# Patient Record
Sex: MALE | Race: BLACK OR AFRICAN AMERICAN | NOT HISPANIC OR LATINO | URBAN - METROPOLITAN AREA
[De-identification: names, ages, dates, MRNs, and addresses within clinical notes are randomized per-mention and may not be internally consistent; named-entity substitution may affect disease eponyms.]

---

## 2017-01-04 ENCOUNTER — EMERGENCY (EMERGENCY)
Facility: HOSPITAL | Age: 47
LOS: 1 days | Discharge: HOME | End: 2017-01-04
Attending: STUDENT IN AN ORGANIZED HEALTH CARE EDUCATION/TRAINING PROGRAM | Admitting: EMERGENCY MEDICINE
Payer: MEDICAID

## 2017-01-04 VITALS
WEIGHT: 315 LBS | SYSTOLIC BLOOD PRESSURE: 202 MMHG | RESPIRATION RATE: 16 BRPM | TEMPERATURE: 98 F | HEART RATE: 79 BPM | DIASTOLIC BLOOD PRESSURE: 118 MMHG | OXYGEN SATURATION: 96 %

## 2017-01-04 VITALS
TEMPERATURE: 98 F | SYSTOLIC BLOOD PRESSURE: 146 MMHG | RESPIRATION RATE: 13 BRPM | OXYGEN SATURATION: 100 % | DIASTOLIC BLOOD PRESSURE: 89 MMHG | HEART RATE: 76 BPM

## 2017-01-04 DIAGNOSIS — G47.33 OBSTRUCTIVE SLEEP APNEA (ADULT) (PEDIATRIC): ICD-10-CM

## 2017-01-04 DIAGNOSIS — E11.9 TYPE 2 DIABETES MELLITUS WITHOUT COMPLICATIONS: ICD-10-CM

## 2017-01-04 DIAGNOSIS — I10 ESSENTIAL (PRIMARY) HYPERTENSION: ICD-10-CM

## 2017-01-04 DIAGNOSIS — F17.200 NICOTINE DEPENDENCE, UNSPECIFIED, UNCOMPLICATED: ICD-10-CM

## 2017-01-04 DIAGNOSIS — R00.2 PALPITATIONS: ICD-10-CM

## 2017-01-04 DIAGNOSIS — Z79.84 LONG TERM (CURRENT) USE OF ORAL HYPOGLYCEMIC DRUGS: ICD-10-CM

## 2017-01-04 PROCEDURE — 99284 EMERGENCY DEPT VISIT MOD MDM: CPT | Mod: 25

## 2017-01-04 PROCEDURE — 99284 EMERGENCY DEPT VISIT MOD MDM: CPT

## 2017-01-04 PROCEDURE — 93005 ELECTROCARDIOGRAM TRACING: CPT

## 2017-01-04 PROCEDURE — 93010 ELECTROCARDIOGRAM REPORT: CPT

## 2017-01-04 RX ORDER — SIMETHICONE 80 MG/1
80 TABLET, CHEWABLE ORAL ONCE
Qty: 0 | Refills: 0 | Status: COMPLETED | OUTPATIENT
Start: 2017-01-04 | End: 2017-01-04

## 2017-01-04 RX ORDER — METOPROLOL TARTRATE 50 MG
50 TABLET ORAL ONCE
Qty: 0 | Refills: 0 | Status: COMPLETED | OUTPATIENT
Start: 2017-01-04 | End: 2017-01-04

## 2017-01-04 RX ORDER — METOPROLOL TARTRATE 50 MG
1 TABLET ORAL
Qty: 60 | Refills: 0 | OUTPATIENT
Start: 2017-01-04 | End: 2017-02-03

## 2017-01-04 RX ADMIN — Medication 50 MILLIGRAM(S): at 04:33

## 2017-01-04 RX ADMIN — SIMETHICONE 80 MILLIGRAM(S): 80 TABLET, CHEWABLE ORAL at 04:33

## 2017-01-04 NOTE — ED PROVIDER NOTE - CARE PLAN
Principal Discharge DX:	HTN (hypertension)  Secondary Diagnosis:	Obstructive sleep apnea syndrome, moderate

## 2017-01-04 NOTE — ED PROVIDER NOTE - OBJECTIVE STATEMENT
46 year old male with a history of DM, HTN presents with palpitations/anxiety. Patient states he has OSAS for which he does not use cpap. He has been waking up at night feeling like he cannot breathe and then becomes anxious. Patient denies chest pain. States he woke up feeling like he was having a panic attack. Has been taking Hypertrol for BP (vitamin) because he had an allergic reaction to lisinopril. Feels well at this time. PMD Dr. Kulkarni

## 2017-01-04 NOTE — ED PROVIDER NOTE - CONSTITUTIONAL, MLM
normal... Well appearing, well nourished, awake, alert, oriented to person, place, time/situation and in no apparent distress. Patient obese

## 2017-01-04 NOTE — ED PROVIDER NOTE - PROGRESS NOTE DETAILS
Patient states he feels well at this time. Declining further w/u including blood work and CXR. States he will f/u with ENT for OSAS and take HTN meds. Denies any complaints at this time

## 2019-03-14 ENCOUNTER — EMERGENCY (EMERGENCY)
Facility: HOSPITAL | Age: 49
LOS: 1 days | Discharge: HOME | End: 2019-03-14
Attending: INTERNAL MEDICINE | Admitting: INTERNAL MEDICINE
Payer: SELF-PAY

## 2019-03-14 VITALS
HEART RATE: 96 BPM | WEIGHT: 238.98 LBS | TEMPERATURE: 98 F | OXYGEN SATURATION: 99 % | RESPIRATION RATE: 16 BRPM | DIASTOLIC BLOOD PRESSURE: 75 MMHG | SYSTOLIC BLOOD PRESSURE: 125 MMHG

## 2019-03-14 DIAGNOSIS — Z98.890 OTHER SPECIFIED POSTPROCEDURAL STATES: Chronic | ICD-10-CM

## 2019-03-14 PROCEDURE — 73030 X-RAY EXAM OF SHOULDER: CPT

## 2019-03-14 PROCEDURE — 73030 X-RAY EXAM OF SHOULDER: CPT | Mod: 26,LT

## 2019-03-14 PROCEDURE — 99284 EMERGENCY DEPT VISIT MOD MDM: CPT

## 2019-03-14 PROCEDURE — 99283 EMERGENCY DEPT VISIT LOW MDM: CPT | Mod: 25

## 2019-03-14 NOTE — ED ADULT NURSE NOTE - NSIMPLEMENTINTERV_GEN_ALL_ED
Implemented All Universal Safety Interventions:  [place] to call system. Call bell, personal items and telephone within reach. Instruct patient to call for assistance. Room bathroom lighting operational. Non-slip footwear when patient is off stretcher. Physically safe environment: no spills, clutter or unnecessary equipment. Stretcher in lowest position, wheels locked, appropriate side rails in place.

## 2019-03-14 NOTE — ED ADULT NURSE NOTE - OBJECTIVE STATEMENT
pt reports unable to abduct left shoulder  He injured arm at work yesterday and has had pain since  skin intact no obvious deformity present

## 2019-03-15 NOTE — ED PROVIDER NOTE - OBJECTIVE STATEMENT
47 y/o black man who fell onto his closed left shoulder a work , he has shoulder pain, and pain on passive range of motion, no weakness, no sensory changes

## 2019-06-08 ENCOUNTER — EMERGENCY (EMERGENCY)
Facility: HOSPITAL | Age: 49
LOS: 1 days | End: 2019-06-08
Attending: INTERNAL MEDICINE
Payer: MEDICAID

## 2019-06-08 VITALS
TEMPERATURE: 99 F | DIASTOLIC BLOOD PRESSURE: 104 MMHG | OXYGEN SATURATION: 98 % | SYSTOLIC BLOOD PRESSURE: 165 MMHG | RESPIRATION RATE: 18 BRPM | WEIGHT: 214.95 LBS | HEART RATE: 78 BPM

## 2019-06-08 DIAGNOSIS — Z98.890 OTHER SPECIFIED POSTPROCEDURAL STATES: Chronic | ICD-10-CM

## 2019-06-08 LAB
ALBUMIN SERPL ELPH-MCNC: 3.8 G/DL — SIGNIFICANT CHANGE UP (ref 3.3–5)
ALP SERPL-CCNC: 63 U/L — SIGNIFICANT CHANGE UP (ref 40–120)
ALT FLD-CCNC: 17 U/L — SIGNIFICANT CHANGE UP (ref 12–78)
ANION GAP SERPL CALC-SCNC: 6 MMOL/L — SIGNIFICANT CHANGE UP (ref 5–17)
AST SERPL-CCNC: 30 U/L — SIGNIFICANT CHANGE UP (ref 15–37)
BILIRUB SERPL-MCNC: 0.3 MG/DL — SIGNIFICANT CHANGE UP (ref 0.2–1.2)
BUN SERPL-MCNC: 18 MG/DL — SIGNIFICANT CHANGE UP (ref 7–23)
CALCIUM SERPL-MCNC: 8.9 MG/DL — SIGNIFICANT CHANGE UP (ref 8.5–10.1)
CHLORIDE SERPL-SCNC: 107 MMOL/L — SIGNIFICANT CHANGE UP (ref 96–108)
CO2 SERPL-SCNC: 28 MMOL/L — SIGNIFICANT CHANGE UP (ref 22–31)
CREAT SERPL-MCNC: 1.2 MG/DL — SIGNIFICANT CHANGE UP (ref 0.5–1.3)
GLUCOSE SERPL-MCNC: 96 MG/DL — SIGNIFICANT CHANGE UP (ref 70–99)
HCT VFR BLD CALC: 38.1 % — LOW (ref 39–50)
HGB BLD-MCNC: 13.5 G/DL — SIGNIFICANT CHANGE UP (ref 13–17)
MCHC RBC-ENTMCNC: 28.9 PG — SIGNIFICANT CHANGE UP (ref 27–34)
MCHC RBC-ENTMCNC: 35.4 GM/DL — SIGNIFICANT CHANGE UP (ref 32–36)
MCV RBC AUTO: 81.6 FL — SIGNIFICANT CHANGE UP (ref 80–100)
NRBC # BLD: 0 /100 WBCS — SIGNIFICANT CHANGE UP (ref 0–0)
PLATELET # BLD AUTO: 198 K/UL — SIGNIFICANT CHANGE UP (ref 150–400)
POTASSIUM SERPL-MCNC: 4 MMOL/L — SIGNIFICANT CHANGE UP (ref 3.5–5.3)
POTASSIUM SERPL-SCNC: 4 MMOL/L — SIGNIFICANT CHANGE UP (ref 3.5–5.3)
PROT SERPL-MCNC: 7.2 G/DL — SIGNIFICANT CHANGE UP (ref 6–8.3)
RBC # BLD: 4.67 M/UL — SIGNIFICANT CHANGE UP (ref 4.2–5.8)
RBC # FLD: 13.8 % — SIGNIFICANT CHANGE UP (ref 10.3–14.5)
SODIUM SERPL-SCNC: 141 MMOL/L — SIGNIFICANT CHANGE UP (ref 135–145)
WBC # BLD: 7.27 K/UL — SIGNIFICANT CHANGE UP (ref 3.8–10.5)
WBC # FLD AUTO: 7.27 K/UL — SIGNIFICANT CHANGE UP (ref 3.8–10.5)

## 2019-06-08 PROCEDURE — 96375 TX/PRO/DX INJ NEW DRUG ADDON: CPT

## 2019-06-08 PROCEDURE — 99284 EMERGENCY DEPT VISIT MOD MDM: CPT

## 2019-06-08 PROCEDURE — 99284 EMERGENCY DEPT VISIT MOD MDM: CPT | Mod: 25

## 2019-06-08 PROCEDURE — 96374 THER/PROPH/DIAG INJ IV PUSH: CPT

## 2019-06-08 PROCEDURE — 36415 COLL VENOUS BLD VENIPUNCTURE: CPT

## 2019-06-08 PROCEDURE — 85027 COMPLETE CBC AUTOMATED: CPT

## 2019-06-08 PROCEDURE — 80053 COMPREHEN METABOLIC PANEL: CPT

## 2019-06-08 RX ORDER — DIPHENHYDRAMINE HCL 50 MG
50 CAPSULE ORAL ONCE
Refills: 0 | Status: COMPLETED | OUTPATIENT
Start: 2019-06-08 | End: 2019-06-08

## 2019-06-08 RX ORDER — HYDROCORTISONE 1 %
1 OINTMENT (GRAM) TOPICAL
Qty: 30 | Refills: 0
Start: 2019-06-08 | End: 2019-06-21

## 2019-06-08 RX ADMIN — Medication 50 MILLIGRAM(S): at 21:38

## 2019-06-08 RX ADMIN — Medication 125 MILLIGRAM(S): at 21:38

## 2019-06-08 NOTE — ED ADULT NURSE NOTE - CCCP TRG CHIEF CMPLNT
Received call from Km Gomez with [facility]. Km Gomez stated she is currently with pt and pt is c/o productive cough and not feeling any better from recent pna. Pt does have mucinex at home, but are unsure if it is safe for pt to take. Notified Km Gomez I would send message to Dr. Roberto Wharton. Km Gomez verbalized understanding of information discussed w/ no further questions at this time. rash

## 2019-06-08 NOTE — ED ADULT NURSE NOTE - BREATH SOUNDS, MLM
[Normal] : no peripheral adenopathy appreciated [Ulcers] : no ulcers [Mucositis] : no mucositis [Thrush] : no thrush [de-identified] : no residual adenopathy .  [de-identified] : no organomegaly, mild RUQ tenderness, no masses , no rebound.  Clear

## 2019-06-08 NOTE — ED PROVIDER NOTE - OBJECTIVE STATEMENT
pt with rash to right side of chest/abd denies pain just itching  rash 50 y/o BF with dermitis  on the right side of his chest and right upper arm. Some itching, no fever, no toxemia, no h/o poison ivy

## 2019-07-01 ENCOUNTER — OUTPATIENT (OUTPATIENT)
Dept: OUTPATIENT SERVICES | Facility: HOSPITAL | Age: 49
LOS: 1 days | End: 2019-07-01
Payer: MEDICAID

## 2019-07-01 DIAGNOSIS — Z98.890 OTHER SPECIFIED POSTPROCEDURAL STATES: Chronic | ICD-10-CM

## 2019-07-01 PROCEDURE — G9001: CPT

## 2019-07-05 DIAGNOSIS — Z71.89 OTHER SPECIFIED COUNSELING: ICD-10-CM

## 2020-01-29 ENCOUNTER — OUTPATIENT (OUTPATIENT)
Dept: OUTPATIENT SERVICES | Facility: HOSPITAL | Age: 50
LOS: 1 days | End: 2020-01-29
Payer: COMMERCIAL

## 2020-01-29 VITALS
SYSTOLIC BLOOD PRESSURE: 160 MMHG | TEMPERATURE: 99 F | WEIGHT: 227.08 LBS | HEART RATE: 58 BPM | OXYGEN SATURATION: 100 % | RESPIRATION RATE: 16 BRPM | DIASTOLIC BLOOD PRESSURE: 84 MMHG

## 2020-01-29 DIAGNOSIS — E11.9 TYPE 2 DIABETES MELLITUS WITHOUT COMPLICATIONS: ICD-10-CM

## 2020-01-29 DIAGNOSIS — Z98.890 OTHER SPECIFIED POSTPROCEDURAL STATES: Chronic | ICD-10-CM

## 2020-01-29 DIAGNOSIS — M75.42 IMPINGEMENT SYNDROME OF LEFT SHOULDER: ICD-10-CM

## 2020-01-29 DIAGNOSIS — Z01.818 ENCOUNTER FOR OTHER PREPROCEDURAL EXAMINATION: ICD-10-CM

## 2020-01-29 DIAGNOSIS — S41.012D LACERATION WITHOUT FOREIGN BODY OF LEFT SHOULDER, SUBSEQUENT ENCOUNTER: ICD-10-CM

## 2020-01-29 LAB
ALBUMIN SERPL ELPH-MCNC: 3.8 G/DL — SIGNIFICANT CHANGE UP (ref 3.3–5)
ALP SERPL-CCNC: 92 U/L — SIGNIFICANT CHANGE UP (ref 40–120)
ALT FLD-CCNC: 16 U/L — SIGNIFICANT CHANGE UP (ref 12–78)
ANION GAP SERPL CALC-SCNC: 3 MMOL/L — LOW (ref 5–17)
AST SERPL-CCNC: 22 U/L — SIGNIFICANT CHANGE UP (ref 15–37)
BILIRUB SERPL-MCNC: 0.4 MG/DL — SIGNIFICANT CHANGE UP (ref 0.2–1.2)
BUN SERPL-MCNC: 20 MG/DL — SIGNIFICANT CHANGE UP (ref 7–23)
CALCIUM SERPL-MCNC: 9.3 MG/DL — SIGNIFICANT CHANGE UP (ref 8.5–10.1)
CHLORIDE SERPL-SCNC: 109 MMOL/L — HIGH (ref 96–108)
CO2 SERPL-SCNC: 28 MMOL/L — SIGNIFICANT CHANGE UP (ref 22–31)
CREAT SERPL-MCNC: 1 MG/DL — SIGNIFICANT CHANGE UP (ref 0.5–1.3)
GLUCOSE SERPL-MCNC: 95 MG/DL — SIGNIFICANT CHANGE UP (ref 70–99)
HBA1C BLD-MCNC: 5.2 % — SIGNIFICANT CHANGE UP (ref 4–5.6)
HCT VFR BLD CALC: 43.3 % — SIGNIFICANT CHANGE UP (ref 39–50)
HGB BLD-MCNC: 15.3 G/DL — SIGNIFICANT CHANGE UP (ref 13–17)
MCHC RBC-ENTMCNC: 29.5 PG — SIGNIFICANT CHANGE UP (ref 27–34)
MCHC RBC-ENTMCNC: 35.3 GM/DL — SIGNIFICANT CHANGE UP (ref 32–36)
MCV RBC AUTO: 83.4 FL — SIGNIFICANT CHANGE UP (ref 80–100)
NRBC # BLD: 0 /100 WBCS — SIGNIFICANT CHANGE UP (ref 0–0)
PLATELET # BLD AUTO: 147 K/UL — LOW (ref 150–400)
POTASSIUM SERPL-MCNC: 4.3 MMOL/L — SIGNIFICANT CHANGE UP (ref 3.5–5.3)
POTASSIUM SERPL-SCNC: 4.3 MMOL/L — SIGNIFICANT CHANGE UP (ref 3.5–5.3)
PROT SERPL-MCNC: 7.5 G/DL — SIGNIFICANT CHANGE UP (ref 6–8.3)
RBC # BLD: 5.19 M/UL — SIGNIFICANT CHANGE UP (ref 4.2–5.8)
RBC # FLD: 13.3 % — SIGNIFICANT CHANGE UP (ref 10.3–14.5)
SODIUM SERPL-SCNC: 140 MMOL/L — SIGNIFICANT CHANGE UP (ref 135–145)
WBC # BLD: 5.92 K/UL — SIGNIFICANT CHANGE UP (ref 3.8–10.5)
WBC # FLD AUTO: 5.92 K/UL — SIGNIFICANT CHANGE UP (ref 3.8–10.5)

## 2020-01-29 PROCEDURE — 85027 COMPLETE CBC AUTOMATED: CPT

## 2020-01-29 PROCEDURE — G0463: CPT

## 2020-01-29 PROCEDURE — 93005 ELECTROCARDIOGRAM TRACING: CPT

## 2020-01-29 PROCEDURE — 93010 ELECTROCARDIOGRAM REPORT: CPT

## 2020-01-29 PROCEDURE — 83036 HEMOGLOBIN GLYCOSYLATED A1C: CPT

## 2020-01-29 PROCEDURE — 80053 COMPREHEN METABOLIC PANEL: CPT

## 2020-01-29 PROCEDURE — 36415 COLL VENOUS BLD VENIPUNCTURE: CPT

## 2020-01-29 RX ORDER — GLUCAGON INJECTION, SOLUTION 0.5 MG/.1ML
1 INJECTION, SOLUTION SUBCUTANEOUS ONCE
Refills: 0 | Status: DISCONTINUED | OUTPATIENT
Start: 2020-02-07 | End: 2020-02-07

## 2020-01-29 RX ORDER — LOSARTAN POTASSIUM 100 MG/1
1 TABLET, FILM COATED ORAL
Qty: 0 | Refills: 0 | DISCHARGE

## 2020-01-29 RX ORDER — SIMVASTATIN 20 MG/1
0 TABLET, FILM COATED ORAL
Qty: 0 | Refills: 0 | DISCHARGE

## 2020-01-29 RX ORDER — SODIUM CHLORIDE 9 MG/ML
1000 INJECTION, SOLUTION INTRAVENOUS
Refills: 0 | Status: DISCONTINUED | OUTPATIENT
Start: 2020-02-07 | End: 2020-02-07

## 2020-01-29 RX ORDER — METFORMIN HYDROCHLORIDE 850 MG/1
0 TABLET ORAL
Qty: 0 | Refills: 0 | DISCHARGE

## 2020-01-29 NOTE — H&P PST ADULT - NSICDXPASTSURGICALHX_GEN_ALL_CORE_FT
PAST SURGICAL HISTORY:  H/O elbow surgery (Left elbow, 1982)    H/O knee surgery (Left, arthoscopic 1989)    S/P colonoscopy (11/2019) PAST SURGICAL HISTORY:  H/O elbow surgery (Left elbow, 1982)    H/O knee surgery (Left, arthroscopic 1989)    S/P colonoscopy (11/2019)

## 2020-01-29 NOTE — H&P PST ADULT - MUSCULOSKELETAL COMMENTS
See HPI Left shoulder - decreased ROM, decreased strength, (+) tenderness to palpation, no edema, no erythema

## 2020-01-29 NOTE — H&P PST ADULT - GENERAL COMMENTS
Pt denies history of travel outside the country within the last 14 days and denies contact with sick people that have traveled internationally

## 2020-01-29 NOTE — H&P PST ADULT - NSICDXPASTMEDICALHX_GEN_ALL_CORE_FT
PAST MEDICAL HISTORY:  HTN (hypertension)     Type 2 diabetes mellitus PAST MEDICAL HISTORY:  HTN (hypertension)     Impingement syndrome of left shoulder     Type 2 diabetes mellitus

## 2020-01-29 NOTE — H&P PST ADULT - NSICDXFAMILYHX_GEN_ALL_CORE_FT
FAMILY HISTORY:  FH: myocardial infarction, (Father )  FHx: ovarian cancer, ([place] ) FAMILY HISTORY:  FH: myocardial infarction, (Father )  FHx: ovarian cancer, (Mother )

## 2020-01-29 NOTE — H&P PST ADULT - RS GEN PE MLT RESP DETAILS PC
normal/breath sounds equal/airway patent/respirations non-labored/clear to auscultation bilaterally/good air movement

## 2020-01-29 NOTE — H&P PST ADULT - NSICDXPROBLEM_GEN_ALL_CORE_FT
PROBLEM DIAGNOSES  Problem: Preoperative testing  Assessment and Plan: Medical clearance needed as per surgeon. CBC, Comprehensive panel, HgbA1c and EKG ordered. Pre-op instructions and surgical scrubs given and pt verbalized understanding.      Problem: Impingement syndrome of left shoulder  Assessment and Plan: Left shoulder arthroscopy with rotator cuff repair on 2/7/20.       Problem: Type 2 diabetes mellitus  Assessment and Plan: Instructed pt of need for endocrine clearance if HgbA1c is 9 or greater. No Metformin 24 hours before and morning of surgery. Pt verbalized understanding.

## 2020-01-29 NOTE — H&P PST ADULT - HISTORY OF PRESENT ILLNESS
50yo male with Type 2 DM and HTN here for PST. Pt s/p fall at work on 3/2019 and sustained injury to left shoulder. Pt s/p physical therapy x 3 months with minimal pain relief, last therapy in 12/2019. Pt complaining of chronic left shoulder pain and rates pain to be 6/10 but pt denies taking po analgesia. Pt s/p MRI and pt states "there is tear of rotator cuff". Pt electing for left shoulder arthroscopy with rotator cuff repair on 2/7/20.

## 2020-02-06 ENCOUNTER — TRANSCRIPTION ENCOUNTER (OUTPATIENT)
Age: 50
End: 2020-02-06

## 2020-02-06 NOTE — ASU PATIENT PROFILE, ADULT - AS SC BRADEN MOISTURE
Please call him and see if was able to get the Levaquin, Dr Jovan Jacinto is aware of the interaction (4) rarely moist

## 2020-02-06 NOTE — ASU PATIENT PROFILE, ADULT - PSH
H/O elbow surgery  (Left elbow, 1982)  H/O knee surgery  (Left, arthroscopic 1989)  S/P colonoscopy  (11/2019)

## 2020-02-07 ENCOUNTER — OUTPATIENT (OUTPATIENT)
Dept: OUTPATIENT SERVICES | Facility: HOSPITAL | Age: 50
LOS: 1 days | End: 2020-02-07
Payer: COMMERCIAL

## 2020-02-07 ENCOUNTER — RESULT REVIEW (OUTPATIENT)
Age: 50
End: 2020-02-07

## 2020-02-07 VITALS
WEIGHT: 227.08 LBS | HEART RATE: 58 BPM | DIASTOLIC BLOOD PRESSURE: 98 MMHG | TEMPERATURE: 99 F | RESPIRATION RATE: 16 BRPM | SYSTOLIC BLOOD PRESSURE: 160 MMHG | OXYGEN SATURATION: 100 %

## 2020-02-07 VITALS
OXYGEN SATURATION: 99 % | RESPIRATION RATE: 14 BRPM | SYSTOLIC BLOOD PRESSURE: 161 MMHG | HEART RATE: 70 BPM | DIASTOLIC BLOOD PRESSURE: 76 MMHG

## 2020-02-07 DIAGNOSIS — Z98.890 OTHER SPECIFIED POSTPROCEDURAL STATES: Chronic | ICD-10-CM

## 2020-02-07 DIAGNOSIS — Z01.818 ENCOUNTER FOR OTHER PREPROCEDURAL EXAMINATION: ICD-10-CM

## 2020-02-07 DIAGNOSIS — S41.012D LACERATION WITHOUT FOREIGN BODY OF LEFT SHOULDER, SUBSEQUENT ENCOUNTER: ICD-10-CM

## 2020-02-07 DIAGNOSIS — M75.42 IMPINGEMENT SYNDROME OF LEFT SHOULDER: ICD-10-CM

## 2020-02-07 PROCEDURE — 29827 SHO ARTHRS SRG RT8TR CUF RPR: CPT | Mod: LT

## 2020-02-07 PROCEDURE — 88304 TISSUE EXAM BY PATHOLOGIST: CPT

## 2020-02-07 PROCEDURE — 82962 GLUCOSE BLOOD TEST: CPT

## 2020-02-07 PROCEDURE — C1713: CPT

## 2020-02-07 PROCEDURE — 88304 TISSUE EXAM BY PATHOLOGIST: CPT | Mod: 26

## 2020-02-07 PROCEDURE — 29826 SHO ARTHRS SRG DECOMPRESSION: CPT | Mod: LT

## 2020-02-07 RX ORDER — HYDROMORPHONE HYDROCHLORIDE 2 MG/ML
0.5 INJECTION INTRAMUSCULAR; INTRAVENOUS; SUBCUTANEOUS
Refills: 0 | Status: DISCONTINUED | OUTPATIENT
Start: 2020-02-07 | End: 2020-02-07

## 2020-02-07 RX ORDER — SODIUM CHLORIDE 9 MG/ML
1000 INJECTION, SOLUTION INTRAVENOUS
Refills: 0 | Status: DISCONTINUED | OUTPATIENT
Start: 2020-02-07 | End: 2020-02-07

## 2020-02-07 RX ORDER — CEFAZOLIN SODIUM 1 G
2000 VIAL (EA) INJECTION ONCE
Refills: 0 | Status: COMPLETED | OUTPATIENT
Start: 2020-02-07 | End: 2020-02-07

## 2020-02-07 RX ORDER — OXYCODONE HYDROCHLORIDE 5 MG/1
5 TABLET ORAL ONCE
Refills: 0 | Status: DISCONTINUED | OUTPATIENT
Start: 2020-02-07 | End: 2020-02-07

## 2020-02-07 RX ORDER — METFORMIN HYDROCHLORIDE 850 MG/1
0 TABLET ORAL
Qty: 0 | Refills: 0 | DISCHARGE

## 2020-02-07 RX ORDER — ONDANSETRON 8 MG/1
4 TABLET, FILM COATED ORAL ONCE
Refills: 0 | Status: DISCONTINUED | OUTPATIENT
Start: 2020-02-07 | End: 2020-02-07

## 2020-02-07 RX ORDER — LOSARTAN POTASSIUM 100 MG/1
1 TABLET, FILM COATED ORAL
Qty: 0 | Refills: 0 | DISCHARGE

## 2020-02-07 RX ADMIN — HYDROMORPHONE HYDROCHLORIDE 0.5 MILLIGRAM(S): 2 INJECTION INTRAMUSCULAR; INTRAVENOUS; SUBCUTANEOUS at 15:43

## 2020-02-07 RX ADMIN — SODIUM CHLORIDE 75 MILLILITER(S): 9 INJECTION, SOLUTION INTRAVENOUS at 11:19

## 2020-02-07 RX ADMIN — SODIUM CHLORIDE 100 MILLILITER(S): 9 INJECTION, SOLUTION INTRAVENOUS at 16:03

## 2020-02-07 RX ADMIN — HYDROMORPHONE HYDROCHLORIDE 0.5 MILLIGRAM(S): 2 INJECTION INTRAMUSCULAR; INTRAVENOUS; SUBCUTANEOUS at 15:31

## 2020-02-07 RX ADMIN — OXYCODONE HYDROCHLORIDE 5 MILLIGRAM(S): 5 TABLET ORAL at 15:55

## 2020-02-07 RX ADMIN — HYDROMORPHONE HYDROCHLORIDE 0.5 MILLIGRAM(S): 2 INJECTION INTRAMUSCULAR; INTRAVENOUS; SUBCUTANEOUS at 15:55

## 2020-02-07 NOTE — ASU DISCHARGE PLAN (ADULT/PEDIATRIC) - CARE PROVIDER_API CALL
Christiano Marquez (DO)  Orthopaedic Surgery  [address] [place] [address]  Phone: [phone]  Fax: [phone]  Follow Up Time:

## 2020-02-07 NOTE — ASU DISCHARGE PLAN (ADULT/PEDIATRIC) - ASU DC SPECIAL INSTRUCTIONSFT
Follow up with Dr. Marquez in 7-10 days. Call office for appointment. Take medications as prescribed. Keep dressing clean, dry, and intact. Rest, ice, and elevate affected extremity. Non weight bearing Left upper extremity in sling immobilizer.

## 2020-10-29 NOTE — ASU PATIENT PROFILE, ADULT - MEDICATION ADMINISTRATION INFO, PROFILE
I spoke with wife. .. She said Lucinda Luo has a Hx of chronic cystitis, and when he gets these infections he becomes altered. Last month he had Hx of pneumonia and was altered at [facility]. Baseline, without infections, pt is A&0x4. Pt has frequent Hx of UTIs. According to wife, He has no Hx of strokes that she knows of and confusion only with infections. no concerns

## 2021-09-30 NOTE — ED ADULT NURSE NOTE - TEMPERATURE IN FAHRENHEIT (DEGREES F)
Yoel;  Overall I think you are doing great.    You do have an enlarged and firm prostate which is causing your symptoms at night.  We will initiate a trial of Flomax.  Please know that Flomax can cause a dry mouth and lightheadedness particularly the first few days that you take it.  Be very careful getting  out of bed and with positional changes during these days.    We will obtain the usual screening tests today    Best regards  Kunal   98.3

## 2022-09-15 NOTE — ED PROVIDER NOTE - TEMPLATE, MLM
Initial Anesthesia Post-op Note    Patient: Darnell Neely  Procedure(s) Performed: FRENULECTOMY; TONGUE  Anesthesia type: General    Vitals Value Taken Time   Temp 36.7 °C (98.1 °F) 09/15/22 0743   Pulse 168 09/15/22 0743   Resp 38 09/15/22 0743   SpO2 100 % 09/15/22 0743   BP 96/59 09/15/22 0743         Patient Location: PACU Phase 1  Post-op Vital Signs:stable  Level of Consciousness: sedated  Respiratory Status: spontaneous ventilation, oral airway and face mask  Cardiovascular stable  Hydration: euvolemic  Pain Management: adequately controlled  Handoff: Handoff to receiving nurse was performed and questions were answered  Vomiting: none  Nausea: None  Airway Patency:oral airway  Post-op Assessment: no complications and patient tolerated procedure well with no complications      No complications documented.  
Cardiac

## 2023-01-18 NOTE — ED PROVIDER NOTE - CHIEF COMPLAINT
DISCHARGE REPORT    Progress reporting period is from 12-1-2022 to 1-.  Pt has been seen 10x since  9-.    SUBJECTIVE  Subjective changes noted by patient:  Overall R shld and arm are doing well.  Last week on an icy day they were short staffed and she was working in [place] - repetitive lifting of 40# and pushing cart weight 300-400# - had pain R upper arm for 1-2 days after.  Feels she would tolerate 100-150# carts, but not full carts 300-400#.    Current Pain level: 0/10 (3/10 last week after work).     Initial Pain level: 5/10 (ranges 0-9/10).   Changes in function:  Yes (See Goal flowsheet attached for changes in current functional level)  Adverse reaction to treatment or activity: activity - pushing full cart at work (300-400#)    OBJECTIVE  Currently no pain.    Cerv Arom:  Flex WNL, Retr WNL, Ext min decr, SB R Slight decr, SB L min-mod decr, Rot R &L WNL.    R Shld AROM: Flex slight decr, Abd WNL, ER (Behind Head) R=L, IR (Behind Back) R to T11/ L to T6.    Strength R Shld Flex, Abd, IR, ER, Biceps, Triceps 5/5 throughout w/o pain.      ASSESSMENT/PLAN  Updated problem list and treatment plan:   Diagnosis 1:  R shoulder pain w/ apparent cervical component  Decreased ROM/flexibility - home program  Decreased function - home program  STG/LTGs have been met or progress has been made towards goals:  Yes (See Goal flow sheet completed today.)  Assessment of Progress: The patient has met all of their long term goals.  Self Management Plans:  Patient is independent in a home treatment program.  Patient is independent in self management of symptoms.    Maribell continues to require the following intervention to meet STG and LTG's:  PT intervention is no longer required to meet STG/LTG.    Recommendations:  This patient is ready to be discharged from therapy and continue their home treatment program.   The patient is a 48y Male complaining of shoulder pain/injury.

## 2023-03-27 ENCOUNTER — OFFICE (OUTPATIENT)
Dept: URBAN - METROPOLITAN AREA CLINIC 34 | Facility: CLINIC | Age: 53
Setting detail: OPHTHALMOLOGY
End: 2023-03-27
Payer: COMMERCIAL

## 2023-03-27 DIAGNOSIS — H00.022: ICD-10-CM

## 2023-03-27 DIAGNOSIS — Z79.84: ICD-10-CM

## 2023-03-27 DIAGNOSIS — H11.153: ICD-10-CM

## 2023-03-27 DIAGNOSIS — E11.9: ICD-10-CM

## 2023-03-27 DIAGNOSIS — H35.362: ICD-10-CM

## 2023-03-27 DIAGNOSIS — H52.4: ICD-10-CM

## 2023-03-27 DIAGNOSIS — H35.033: ICD-10-CM

## 2023-03-27 DIAGNOSIS — H25.13: ICD-10-CM

## 2023-03-27 PROCEDURE — 92014 COMPRE OPH EXAM EST PT 1/>: CPT | Performed by: OPHTHALMOLOGY

## 2023-03-27 ASSESSMENT — REFRACTION_MANIFEST
OS_CYLINDER: +0.75
OS_VA1: 20/20
OS_AXIS: 163
OS_SPHERE: -0.50
OD_VA1: 20/20
OS_CYLINDER: SPH
OD_AXIS: 075
OS_SPHERE: +1.00
OD_VA1: 20/20
OD_CYLINDER: +0.50
OD_SPHERE: +1.00
OS_VA2: 20/20
OD_AXIS: 70
OD_ADD: +1.75
OD_CYLINDER: +0.75
OD_SPHERE: -0.50
OS_ADD: +1.75
OS_VA1: 20/20
OD_VA2: 20/20

## 2023-03-27 ASSESSMENT — VISUAL ACUITY
OD_BCVA: 20/20
OS_BCVA: 20/20

## 2023-03-27 ASSESSMENT — LID EXAM ASSESSMENTS
OD_COMMENTS: HYPEREMIA, MISSING GLANDS
OD_MEIBOMITIS: RLL 1+

## 2023-03-27 ASSESSMENT — AXIALLENGTH_DERIVED
OS_AL: 22.4279
OD_AL: 22.6951
OD_AL: 23.0151
OD_AL: 22.4279

## 2023-03-27 ASSESSMENT — REFRACTION_AUTOREFRACTION
OS_SPHERE: PLANO
OS_AXIS: 166
OS_CYLINDER: +0.75
OD_AXIS: 060
OD_CYLINDER: +0.75
OD_SPHERE: +0.25

## 2023-03-27 ASSESSMENT — REFRACTION_CURRENTRX
OD_OVR_VA: 20/
OS_OVR_VA: 20/
OD_SPHERE: +2.00
OD_CYLINDER: SPH
OS_SPHERE: +2.00
OS_CYLINDER: SPH

## 2023-03-27 ASSESSMENT — SPHEQUIV_DERIVED
OD_SPHEQUIV: 0.625
OD_SPHEQUIV: 1.375
OS_SPHEQUIV: 1.375
OD_SPHEQUIV: -0.25

## 2023-03-27 ASSESSMENT — KERATOMETRY
METHOD_AUTO_MANUAL: AUTO
OD_K2POWER_DIOPTERS: 46.00
OD_AXISANGLE_DEGREES: 074
OS_K2POWER_DIOPTERS: 45.75
OS_AXISANGLE_DEGREES: 115
OD_K1POWER_DIOPTERS: 44.75
OS_K1POWER_DIOPTERS: 45.00

## 2023-03-27 ASSESSMENT — CONFRONTATIONAL VISUAL FIELD TEST (CVF)
OD_FINDINGS: FULL
OS_FINDINGS: FULL

## 2023-05-09 NOTE — ED ADULT NURSE NOTE - CAS DISCH TRANSFER METHOD
Social work / Discharge Planning:        Social work met with patient for initial assessment and explained role. Patient lives with her daughter and has aww and wc. She goes to [address] and her daughter drives her. Patient has used [place] in the past and has no history of VENESSA. Home oxygen baseline is 2 liters provided by [employer]. Patient states that she has all of her necessary diabetic supplies. PT/OT evaluations ordered. Patient anticipates discharge home with no needs. Case Management Assessment  Initial Evaluation    Date/Time of Evaluation: 5/9/2023 2:29 PM  Assessment Completed by: HILDA Amador    If patient is discharged prior to next notation, then this note serves as note for discharge by case management. Patient Name: Emily Michaud                   YOB: 1958  Diagnosis: Hyperkalemia [E87.5]                   Date / Time: 5/8/2023 12:01 PM    Patient Admission Status: Inpatient   Readmission Risk (Low < 19, Mod (19-27), High > 27): Readmission Risk Score: 23.3    Current PCP: Lauren Gallardo MD  PCP verified by CM? Yes    Chart Reviewed: Yes      History Provided by: Patient  Patient Orientation: Alert and Oriented    Patient Cognition: Alert    Hospitalization in the last 30 days (Readmission):  No    If yes, Readmission Assessment in CM Navigator will be completed.     Advance Directives:      Code Status: Full Code   Patient's Primary Decision Maker is:      Primary Decision Maker: Irineo Willis - Child - [phone]    Secondary Decision Maker: José Antonio Patrick  Child - [phone]    Discharge Planning:    Patient lives with: Children Type of Home: House  Primary Care Giver: Self  Patient Support Systems include: Children   Current Financial resources:    Current community resources:    Current services prior to admission: Other (Comment) (dialysis)            Current DME:              Type of Home Care services: Private car

## 2023-06-21 ENCOUNTER — EMERGENCY (EMERGENCY)
Facility: HOSPITAL | Age: 53
LOS: 1 days | Discharge: HOME | End: 2023-06-21
Attending: STUDENT IN AN ORGANIZED HEALTH CARE EDUCATION/TRAINING PROGRAM | Admitting: STUDENT IN AN ORGANIZED HEALTH CARE EDUCATION/TRAINING PROGRAM
Payer: MEDICAID

## 2023-06-21 VITALS
OXYGEN SATURATION: 99 % | TEMPERATURE: 98 F | RESPIRATION RATE: 16 BRPM | HEART RATE: 70 BPM | SYSTOLIC BLOOD PRESSURE: 200 MMHG | HEIGHT: 73 IN | DIASTOLIC BLOOD PRESSURE: 107 MMHG | WEIGHT: 255.07 LBS

## 2023-06-21 VITALS
RESPIRATION RATE: 18 BRPM | SYSTOLIC BLOOD PRESSURE: 165 MMHG | HEART RATE: 81 BPM | OXYGEN SATURATION: 98 % | DIASTOLIC BLOOD PRESSURE: 90 MMHG

## 2023-06-21 DIAGNOSIS — Z98.890 OTHER SPECIFIED POSTPROCEDURAL STATES: Chronic | ICD-10-CM

## 2023-06-21 PROBLEM — E11.9 TYPE 2 DIABETES MELLITUS WITHOUT COMPLICATIONS: Chronic | Status: ACTIVE | Noted: 2020-01-29

## 2023-06-21 PROBLEM — M75.42 IMPINGEMENT SYNDROME OF LEFT SHOULDER: Chronic | Status: ACTIVE | Noted: 2020-01-29

## 2023-06-21 PROCEDURE — 99283 EMERGENCY DEPT VISIT LOW MDM: CPT

## 2023-06-21 RX ORDER — FLUTICASONE PROPIONATE 50 MCG
1 SPRAY, SUSPENSION NASAL
Qty: 1 | Refills: 0
Start: 2023-06-21 | End: 2023-06-27

## 2023-06-21 RX ORDER — LORATADINE 10 MG/1
1 TABLET ORAL
Qty: 14 | Refills: 0
Start: 2023-06-21 | End: 2023-07-04

## 2023-06-21 RX ORDER — VALSARTAN 80 MG/1
320 TABLET ORAL ONCE
Refills: 0 | Status: COMPLETED | OUTPATIENT
Start: 2023-06-21 | End: 2023-06-21

## 2023-06-21 RX ORDER — AMLODIPINE BESYLATE 2.5 MG/1
5 TABLET ORAL ONCE
Refills: 0 | Status: COMPLETED | OUTPATIENT
Start: 2023-06-21 | End: 2023-06-21

## 2023-06-21 RX ADMIN — AMLODIPINE BESYLATE 5 MILLIGRAM(S): 2.5 TABLET ORAL at 11:48

## 2023-06-21 RX ADMIN — VALSARTAN 320 MILLIGRAM(S): 80 TABLET ORAL at 11:48

## 2023-06-21 NOTE — ED ADULT NURSE NOTE - NSFALLUNIVINTERV_ED_ALL_ED
Bed/Stretcher in lowest position, wheels locked, appropriate side rails in place/Call bell, personal items and telephone in reach/Instruct patient to call for assistance before getting out of bed/chair/stretcher/Non-slip footwear applied when patient is off stretcher/[place] to call system/Physically safe environment - no spills, clutter or unnecessary equipment/Purposeful proactive rounding/Room/bathroom lighting operational, light cord in reach

## 2023-06-21 NOTE — ED PROVIDER NOTE - CLINICAL SUMMARY MEDICAL DECISION MAKING FREE TEXT BOX
I, Vijay Pak MD, have seen and examined the patient on the date of service.  I agree with the JESUS's assessment as written, with exceptions or additions as noted below or in a separate note.  Patient with a history of hypertension is coming in with bilateral ear fullness and nasal congestion for the past month.  Has been improved with medication over-the-counter but has not consistently been taking them.  Has had more exposure to pollen recently.  States that his symptoms are worse in the morning when he wakes up.  No fevers or persistent drainage.  No focal findings on exam.  Suspect likely allergic rhinitis and allergic sinusitis.  Do not suspect bacterial at this time given no fevers and he is well-appearing.  Also found to be hypertensive here but did not take his medications this morning.  Will give home meds, treat with Flonase and Claritin and discharged with primary care follow-up.

## 2023-06-21 NOTE — ED ADULT NURSE NOTE - NS_SISCREENINGSR_GEN_ALL_ED
Routing refill request to provider for review/approval because:  Drug not on the [facility] refill protocol   PCP retired. Patient needs to be seen because it has been more than 1 year since last office visit (has OV scheduled 10/5 with Irasema Donis)    Controlled Substance Refill Request for   Requested Prescriptions   Pending Prescriptions Disp Refills     zolpidem (AMBIEN) 5 MG tablet  Last Written Prescription Date:  9/3/2020  Last Fill Quantity: 30,  # refills: 0   Last office visit: 9/10/2019 with prescribing provider:  LAKESHA Delacruz   Future Office Visit:   Next 5 appointments (look out 90 days)    Oct 05, 2020  7:40 AM CDT  [facility]YS[facility] with [facility] [address]  [facility] [address]  [phone]        30 tablet 0     Sig: Take 1 tablet (5 mg) by mouth nightly as needed for sleep       There is no refill protocol information for this order          Problem List Complete:  No     PROVIDER TO CONSIDER COMPLETION OF PROBLEM LIST AND OVERVIEW/CONTROLLED SUBSTANCE AGREEMENT      Future Office visit:   Next 5 appointments (look out 90 days)    Oct 05, 2020  7:40 AM CDT  PHYSICAL with Irasema Donis MD  [facility] [address] [place]  [facility] [address]  [phone]          Controlled substance agreement:   Encounter-Level CSA:    There are no encounter-level csa.     Patient-Level CSA:    There are no patient-level csa.         Last Urine Drug Screen: No results found for: CDAUT, No results found for: COMDAT, No results found for: THC13, PCP13, COC13, MAMP13, OPI13, AMP13, BZO13, TCA13, MTD13, BAR13, OXY13, PPX13, BUP13     Processing:  Rx to be electronically transmitted to pharmacy by provider      [email or web address]       checked in past 3 months?  Yes 9/29/2020, no concerns   [place] reviewed:  reviewed- no concerns    [place]  profile:  [email or web address]  Routing refill request to provider for review/approval because:  Drug not on the [facility] refill protocol                Negative

## 2023-06-21 NOTE — ED PROVIDER NOTE - NSFOLLOWUPCLINICS_GEN_ALL_ED_FT
[facility] Allergy and Immunology  Allergy  [address] [place] [address]  Phone: [phone]  Fax:

## 2023-06-21 NOTE — ED PROVIDER NOTE - NSFOLLOWUPINSTRUCTIONS_ED_ALL_ED_FT
Follow up with pcp and allergist  return to er for any worsening symptoms     Allergies, Adult  An allergy means that your body reacts to something that bothers it (allergen). This can happen from something that you eat, breathe in, or touch.    Allergies often affect the nose, eyes, skin, and stomach. They can be mild, moderate, or very bad (severe). An allergy cannot spread from person to person. They can happen at any age. Sometimes, people outgrow them.    What are the causes?  Outdoor things, such as pollen, car fumes, and mold.  Indoor things, such as dust, smoke, mold, and pets.  Foods.  Medicines.  Things that bother your skin, such as perfume and bug bites.  What increases the risk?  Having family members with allergies or asthma.  What are the signs or symptoms?  Symptoms depend on how bad your allergy is.    Mild to moderate symptoms    Runny nose, stuffy nose, or sneezing.  Itchy mouth, ears, or throat.  A feeling of mucus dripping down the back of your throat.  Sore throat.  Eyes that are itchy, red, watery, or puffy.  A skin rash, or red, swollen areas of skin (hives).  Stomach cramps or bloating.  Severe symptoms    Very bad allergies to food, medicine, or bug bites may cause a very bad allergy reaction (anaphylaxis). This can be life-threatening. Symptoms include:  A red face.  Wheezing or coughing.  Swollen lips, tongue, or mouth.  Tight or swollen throat.  Chest pain or tightness, or a fast heartbeat.  Trouble breathing or shortness of breath.  Pain in your belly (abdomen), vomiting, or watery poop (diarrhea).  Feeling dizzy or fainting.  How is this treated?      Treatment for this condition depends on your symptoms. Treatment may include:  Cold, wet cloths for itching and swelling.  Eye drops, nose sprays, or skin creams.  Washing out your nose each day.  A humidifier.  Medicines.  A change to the foods you eat.  Being exposed again and again to tiny amounts of allergens. This helps your body get used to them. You might have:  Allergy shots.  Very small amounts of allergen put under your tongue.  An emergency shot (auto-injector pen) if you have a very bad allergy reaction.  This is a medicine with a needle. You can put it into your skin by yourself.  Your doctor will teach you how to use it.  Follow these instructions at home:  Medicines      Take or apply over-the-counter and prescription medicines only as told by your doctor.  If you are at risk for a very bad allergy reaction, keep an auto-injector pen with you all the time.  Eating and drinking    Follow instructions from your doctor about what to eat and drink.  Drink enough fluid to keep your pee (urine) pale yellow.  General instructions    If you have ever had a very bad allergy reaction, wear a medical alert bracelet or necklace.  Stay away from things that you are allergic to.  Keep all follow-up visits as told by your doctor. This is important.  Contact a doctor if:  Your symptoms do not get better with treatment.  Get help right away if:  You have symptoms of a very bad allergy reaction. These include:  A swollen mouth, tongue, or throat.  Pain or tightness in your chest.  Trouble breathing.  Being short of breath.  Dizziness.  Fainting.  Very bad pain in your belly.  Vomiting.  Watery poop.  These symptoms may be an emergency. Do not wait to see if the symptoms will go away. Get medical help right away. Call your local emergency services (911 in the [place] Do not drive yourself to the hospital.    Summary  Take or apply over-the-counter and prescription medicines only as told by your doctor.  Stay away from things you are allergic to.  If you are at risk for a very bad allergy reaction, carry an auto-injector pen all the time.  Wear a medical alert bracelet or necklace.  Very bad allergy reactions can be life-threatening. Get help right away.  This information is not intended to replace advice given to you by your health care provider. Make sure you discuss any questions you have with your health care provider.

## 2023-06-21 NOTE — ED ADULT NURSE NOTE - OBJECTIVE STATEMENT
Patient received complaining of intermittent ringing in his ear x1 week. States has been hot in apartment and with the windows open allergies have been making the symptoms worse. States did not take BP meds this morning. Denies headache, dizziness, lightheadedness, steady gait. no changes in vision. Patient is AOx4, ambulatory, safety precautions in place,

## 2023-06-21 NOTE — ED PROVIDER NOTE - PATIENT PORTAL LINK FT
You can access the FollowMyHealth Patient Portal offered by [facility] by registering at the following website: [email or web address]. By joining [employer]’s FollowMyHealth portal, you will also be able to view your health information using other applications (apps) compatible with our system.

## 2023-06-21 NOTE — ED PROVIDER NOTE - OBJECTIVE STATEMENT
Patient is a 53-year-old male with past medical history of diabetes and hypertension coming in for bilateral ear fullness and pressure for 1 week.  Patient states we will continue apartment and is always hot in the department and he sleeps with his windows open and now due to allergy season symptoms of gotten worse.  Patient denies any coughing fever chills headache dizziness chest pain shortness of breath numbness tingling weakness.  Patient denies any ear pain or trauma to the ear.

## 2023-07-05 ENCOUNTER — NON-APPOINTMENT (OUTPATIENT)
Age: 53
End: 2023-07-05

## 2023-07-05 ENCOUNTER — APPOINTMENT (OUTPATIENT)
Dept: INTERNAL MEDICINE | Facility: CLINIC | Age: 53
End: 2023-07-05
Payer: MEDICAID

## 2023-07-05 VITALS
HEART RATE: 73 BPM | HEIGHT: 73 IN | OXYGEN SATURATION: 99 % | SYSTOLIC BLOOD PRESSURE: 164 MMHG | DIASTOLIC BLOOD PRESSURE: 100 MMHG | BODY MASS INDEX: 34.06 KG/M2 | WEIGHT: 257 LBS

## 2023-07-05 VITALS — SYSTOLIC BLOOD PRESSURE: 152 MMHG | DIASTOLIC BLOOD PRESSURE: 90 MMHG

## 2023-07-05 DIAGNOSIS — Z13.228 ENCOUNTER FOR SCREENING FOR OTHER METABOLIC DISORDERS: ICD-10-CM

## 2023-07-05 DIAGNOSIS — Z12.9 ENCOUNTER FOR SCREENING FOR MALIGNANT NEOPLASM, SITE UNSPECIFIED: ICD-10-CM

## 2023-07-05 DIAGNOSIS — Z11.3 ENCOUNTER FOR SCREENING FOR INFECTIONS WITH A PREDOMINANTLY SEXUAL MODE OF TRANSMISSION: ICD-10-CM

## 2023-07-05 DIAGNOSIS — Z80.41 FAMILY HISTORY OF MALIGNANT NEOPLASM OF OVARY: ICD-10-CM

## 2023-07-05 DIAGNOSIS — Z78.9 OTHER SPECIFIED HEALTH STATUS: ICD-10-CM

## 2023-07-05 DIAGNOSIS — Z82.49 FAMILY HISTORY OF ISCHEMIC HEART DISEASE AND OTHER DISEASES OF THE CIRCULATORY SYSTEM: ICD-10-CM

## 2023-07-05 DIAGNOSIS — Z13.6 ENCOUNTER FOR SCREENING FOR CARDIOVASCULAR DISORDERS: ICD-10-CM

## 2023-07-05 LAB
25(OH)D3 SERPL-MCNC: 24.4 NG/ML
ALBUMIN SERPL ELPH-MCNC: 4.5 G/DL
ALP BLD-CCNC: 61 U/L
ALT SERPL-CCNC: 6 U/L
ANION GAP SERPL CALC-SCNC: 12 MMOL/L
APPEARANCE: CLEAR
AST SERPL-CCNC: 24 U/L
BACTERIA: NEGATIVE /HPF
BILIRUB SERPL-MCNC: 0.4 MG/DL
BILIRUBIN URINE: NEGATIVE
BLOOD URINE: ABNORMAL
BUN SERPL-MCNC: 15 MG/DL
CALCIUM SERPL-MCNC: 9.6 MG/DL
CAST: 0 /LPF
CHLORIDE SERPL-SCNC: 106 MMOL/L
CHOLEST SERPL-MCNC: 178 MG/DL
CK SERPL-CCNC: 311 U/L
CO2 SERPL-SCNC: 23 MMOL/L
COLOR: YELLOW
CREAT SERPL-MCNC: 0.96 MG/DL
CREAT SPEC-SCNC: 112 MG/DL
EGFR: 93 ML/MIN/1.73M2
EPITHELIAL CELLS: 0 /HPF
ESTIMATED AVERAGE GLUCOSE: 114 MG/DL
FERRITIN SERPL-MCNC: 197 NG/ML
FOLATE SERPL-MCNC: 13.2 NG/ML
GLUCOSE QUALITATIVE U: NEGATIVE MG/DL
GLUCOSE SERPL-MCNC: 101 MG/DL
HBA1C MFR BLD HPLC: 5.6 %
HDLC SERPL-MCNC: 55 MG/DL
IRON SATN MFR SERPL: 19 %
IRON SERPL-MCNC: 63 UG/DL
KETONES URINE: ABNORMAL MG/DL
LDLC SERPL CALC-MCNC: 115 MG/DL
LEUKOCYTE ESTERASE URINE: NEGATIVE
MICROALBUMIN 24H UR DL<=1MG/L-MCNC: 2 MG/DL
MICROALBUMIN/CREAT 24H UR-RTO: 18 MG/G
MICROSCOPIC-UA: NORMAL
NITRITE URINE: NEGATIVE
NONHDLC SERPL-MCNC: 123 MG/DL
PH URINE: 6
POTASSIUM SERPL-SCNC: 4.3 MMOL/L
PROT SERPL-MCNC: 7.8 G/DL
PROTEIN URINE: NEGATIVE MG/DL
PSA SERPL-MCNC: 0.89 NG/ML
RED BLOOD CELLS URINE: 1 /HPF
SODIUM SERPL-SCNC: 140 MMOL/L
SPECIFIC GRAVITY URINE: 1.02
T4 FREE SERPL-MCNC: 1.2 NG/DL
TIBC SERPL-MCNC: 330 UG/DL
TRIGL SERPL-MCNC: 42 MG/DL
TSH SERPL-ACNC: 0.55 UIU/ML
UIBC SERPL-MCNC: 267 UG/DL
URATE SERPL-MCNC: 4.3 MG/DL
UROBILINOGEN URINE: 1 MG/DL
VIT B12 SERPL-MCNC: 603 PG/ML
WHITE BLOOD CELLS URINE: 0 /HPF

## 2023-07-05 PROCEDURE — 93000 ELECTROCARDIOGRAM COMPLETE: CPT

## 2023-07-05 PROCEDURE — 99204 OFFICE O/P NEW MOD 45 MIN: CPT | Mod: 25

## 2023-07-05 RX ORDER — FEXOFENADINE HCL 60 MG/1
60 TABLET, FILM COATED ORAL TWICE DAILY
Qty: 60 | Refills: 0 | Status: ACTIVE | COMMUNITY
Start: 2023-07-05 | End: 1900-01-01

## 2023-07-05 RX ORDER — FLUTICASONE PROPIONATE 50 UG/1
50 SPRAY, METERED NASAL DAILY
Refills: 0 | Status: ACTIVE | COMMUNITY
Start: 2023-07-05

## 2023-07-05 RX ORDER — LORATADINE 10 MG/1
10 TABLET ORAL DAILY
Refills: 0 | Status: ACTIVE | COMMUNITY
Start: 2023-07-05

## 2023-07-05 RX ORDER — ATORVASTATIN CALCIUM 40 MG/1
40 TABLET, FILM COATED ORAL
Qty: 1 | Refills: 1 | Status: ACTIVE | COMMUNITY
Start: 2023-07-05

## 2023-07-05 NOTE — HEALTH RISK ASSESSMENT
[0] : 2) Feeling down, depressed, or hopeless: Not at all (0) [PHQ-2 Negative - No further assessment needed] : PHQ-2 Negative - No further assessment needed [Former] : Former [[id number]] : 0 [0-4] : 0-4

## 2023-07-05 NOTE — HISTORY OF PRESENT ILLNESS
[FreeTextEntry1] : establish care, follow up to ER visit [de-identified] : Pt is a 52 y/o male with a PMHx of diabetes, HTN, HLD who presents to establish care after ER visit.. Pt was in the ER on 6/21 for ear/sinus congetsion for 1 month.  Was given loratidine and Flonase spray which helped a little but not much.  He c/o of muffled hearing and pain/pressure in his ears and sinuses that started about a month ago after he moved houses. Sometimes has tinnitus. Think he might be allergic to something. Denies f/c, n/v, runny nose, soar throat.\par Is not compliant with his most of his meds. bp high\par Denies weight loss\par  Denies chest pain, sob, whittaker, dizziness, diaphoresis, palpitations, LE swelling, orthopnea, syncope, n/v, headache.\par

## 2023-07-05 NOTE — PHYSICAL EXAM
[No Acute Distress] : no acute distress [Well Nourished] : well nourished [Well Developed] : well developed [Well-Appearing] : well-appearing [Normal Sclera/Conjunctiva] : normal sclera/conjunctiva [Normal Outer Ear/Nose] : the outer ears and nose were normal in appearance [Normal Oropharynx] : the oropharynx was normal [No JVD] : no jugular venous distention [No Lymphadenopathy] : no lymphadenopathy [Supple] : supple [No Respiratory Distress] : no respiratory distress  [No Accessory Muscle Use] : no accessory muscle use [Clear to Auscultation] : lungs were clear to auscultation bilaterally [Normal Rate] : normal rate  [Regular Rhythm] : with a regular rhythm [Normal S1, S2] : normal S1 and S2 [No Murmur] : no murmur heard [No Carotid Bruits] : no carotid bruits [No Varicosities] : no varicosities [Pedal Pulses Present] : the pedal pulses are present [No Edema] : there was no peripheral edema [No Extremity Clubbing/Cyanosis] : no extremity clubbing/cyanosis [Soft] : abdomen soft [Non Tender] : non-tender [Non-distended] : non-distended [Normal Bowel Sounds] : normal bowel sounds [Normal Anterior Cervical Nodes] : no anterior cervical lymphadenopathy [No CVA Tenderness] : no CVA  tenderness [No Spinal Tenderness] : no spinal tenderness [No Joint Swelling] : no joint swelling [Grossly Normal Strength/Tone] : grossly normal strength/tone [No Rash] : no rash [Coordination Grossly Intact] : coordination grossly intact [No Focal Deficits] : no focal deficits [Normal Gait] : normal gait [Alert and Oriented x3] : oriented to person, place, and time [Normal TMs] : both tympanic membranes were normal

## 2023-07-05 NOTE — REVIEW OF SYSTEMS
[Negative] : Heme/Lymph [Earache] : earache [Fever] : no fever [Nasal Discharge] : no nasal discharge [Sore Throat] : no sore throat [FreeTextEntry4] : sinus presssure

## 2023-07-06 LAB — TOTAL IGE SMQN RAST: 270 KU/L

## 2023-07-24 ENCOUNTER — APPOINTMENT (OUTPATIENT)
Dept: OTOLARYNGOLOGY | Facility: CLINIC | Age: 53
End: 2023-07-24
Payer: MEDICAID

## 2023-07-24 VITALS
HEIGHT: 73 IN | SYSTOLIC BLOOD PRESSURE: 167 MMHG | TEMPERATURE: 98 F | BODY MASS INDEX: 34.06 KG/M2 | DIASTOLIC BLOOD PRESSURE: 82 MMHG | WEIGHT: 257 LBS | HEART RATE: 68 BPM

## 2023-07-24 DIAGNOSIS — J34.89 OTHER SPECIFIED DISORDERS OF NOSE AND NASAL SINUSES: ICD-10-CM

## 2023-07-24 DIAGNOSIS — H93.8X9 OTHER SPECIFIED DISORDERS OF EAR, UNSPECIFIED EAR: ICD-10-CM

## 2023-07-24 DIAGNOSIS — Z91.09 OTHER ALLERGY STATUS, OTHER THAN TO DRUGS AND BIOLOGICAL SUBSTANCES: ICD-10-CM

## 2023-07-24 DIAGNOSIS — H90.3 SENSORINEURAL HEARING LOSS, BILATERAL: ICD-10-CM

## 2023-07-24 DIAGNOSIS — H69.80 OTHER SPECIFIED DISORDERS OF EUSTACHIAN TUBE, UNSPECIFIED EAR: ICD-10-CM

## 2023-07-24 PROCEDURE — 99204 OFFICE O/P NEW MOD 45 MIN: CPT | Mod: 25

## 2023-07-24 PROCEDURE — 92567 TYMPANOMETRY: CPT

## 2023-07-24 PROCEDURE — 31231 NASAL ENDOSCOPY DX: CPT

## 2023-07-24 PROCEDURE — 92557 COMPREHENSIVE HEARING TEST: CPT

## 2023-07-24 RX ORDER — LORATADINE 5 MG/5 ML
0.05 SOLUTION, ORAL ORAL
Qty: 1 | Refills: 0 | Status: ACTIVE | COMMUNITY
Start: 2023-07-24 | End: 1900-01-01

## 2023-07-24 NOTE — CONSULT LETTER
[FreeTextEntry1] : Dear Dr. Esquivel,\par I had the pleasure of evaluating your patient MARY GARCIA, thank you for allowing us to participate in their care. please see full note detailing our visit below.\par If you have any questions, please do not hesitate to call me and I would be happy to discuss further. \par \par Eloy Rosario M.D.\par Attending Physician,  \par Department of Otolaryngology - Head and Neck Surgery\par [facility] \par Office: [phone]\par Fax: [phone]\par \par

## 2023-07-24 NOTE — PROCEDURE
[FreeTextEntry6] : Procedure performed: Nasal Endoscopy- Diagnostic\par Pre-op indication(s): nasal congestion\par Post-op indication(s): nasal congestion\par Verbal and/or written consent obtained from patient\par Anterior rhinoscopy insufficient to account for symptoms\par Scope #: 3, flexible fiber optic telescope\par The scope was introduced in the nasal passage between the middle and inferior turbinates to exam the inferior portion of the middle meatus and the fontanelle, as well as the maxillary ostia. Next, the scope was passed medically and posteriorly to the middle turbinates to examine the sphenoethmoid recess and the superior turbinate region.\par \par Upon visualization the finders are as follows:\par Nasal Septum: sigmoidal septal deviation\par Bilateral - Mucosa: boggy turbinates, Mucous: scant, Polyp: not seen, Inferior Turbinate: boggy, Middle Turbinate: normal, Superior Turbinate: normal, Inferior Meatus: narrow, Middle Meatus: narrow, Super Meatus: normal, Sphenoethmoidal Recess: clear\par paradoxical left middle turb

## 2023-07-24 NOTE — PHYSICAL EXAM
[Normal] : mucosa is normal [Midline] : trachea located in midline position [de-identified] : dry

## 2023-07-24 NOTE — ASSESSMENT
[FreeTextEntry1] : 53 year old male seen today for ear fullness and pressure. On exam, ears dry bilaterally. Otherwise benign exam. \par - advised likely has ETD.\par - audiogram performed and reviewed 7/24/23\par Mild bilateral sensory neural hearing loss on audiological evaluation. At this point clinically not severe and it does not significant limitation in function, discuses what to look for in regards to signs of worsening hearing loss that may require re-evaluation. Also discussed behavioral techniques and environmental controls for improving function . normal tymps \par - ear hygiene\par - advised patient pressure at ears likely secondary to sinuses \par \par Patient also with sinus pressure. On exam, paradoxical left middle turb and turbinate hypertrophy. \par discussed options: \par 1) continue conservative management with allergy treatment and nasal sprays. \par 2) allergy testing; must be off antihistamine for 2 weeks\par 3) sinusitis regiment \par - We will proceed with a regiment of decongestants, antibiotics and irrigation to try to break the cycle of inflammation and obstruction. this will treat the acute symptoms and will hopefully allow for maintenance therapy to reach disease areas and avoid further intervention.\par - advised should double check with primary care prior to taking steroid as he has diabetes\par 4) order CT scan to further evaluate sinuses. \par

## 2023-07-24 NOTE — HISTORY OF PRESENT ILLNESS
[de-identified] : 53 year old male seen today for ear fullness. States has ear fullness in both ears that moves to his forehead and then to his cheek. no Vertigo, tinnitus, pain, drainage or facial weakness. \par \par Also with runny and stuffy nose which can either be from one nostril or both. Has pressure at forehead and cheek which he thinks originates from ears. Denies sinus infections. Has been using Flonase everyday since visit to the hospital. Also taking Fexofenadine.\par \par \par

## 2023-07-24 NOTE — END OF VISIT
[FreeTextEntry3] : I personally saw and examined the patient in detail. I spoke to RON Rodriguez regarding the assessment and plan of care.  I preformed the procedures and I reviewed the above assessment and plan of care, and agree. I have made changes in changes in the body of the note where appropriate.

## 2023-08-02 ENCOUNTER — APPOINTMENT (OUTPATIENT)
Dept: INTERNAL MEDICINE | Facility: CLINIC | Age: 53
End: 2023-08-02
Payer: MEDICAID

## 2023-08-02 VITALS
HEART RATE: 83 BPM | HEIGHT: 73 IN | SYSTOLIC BLOOD PRESSURE: 180 MMHG | WEIGHT: 257 LBS | DIASTOLIC BLOOD PRESSURE: 90 MMHG | BODY MASS INDEX: 34.06 KG/M2 | OXYGEN SATURATION: 99 %

## 2023-08-02 VITALS — SYSTOLIC BLOOD PRESSURE: 170 MMHG | DIASTOLIC BLOOD PRESSURE: 90 MMHG

## 2023-08-02 DIAGNOSIS — I10 ESSENTIAL (PRIMARY) HYPERTENSION: ICD-10-CM

## 2023-08-02 DIAGNOSIS — R09.81 NASAL CONGESTION: ICD-10-CM

## 2023-08-02 DIAGNOSIS — E55.9 VITAMIN D DEFICIENCY, UNSPECIFIED: ICD-10-CM

## 2023-08-02 PROCEDURE — 99214 OFFICE O/P EST MOD 30 MIN: CPT

## 2023-08-02 RX ORDER — METHYLPREDNISOLONE 4 MG/1
4 TABLET ORAL
Qty: 1 | Refills: 0 | Status: DISCONTINUED | COMMUNITY
Start: 2023-07-24 | End: 2023-08-02

## 2023-08-02 RX ORDER — AMOXICILLIN AND CLAVULANATE POTASSIUM 875; 125 MG/1; MG/1
875-125 TABLET, COATED ORAL TWICE DAILY
Qty: 20 | Refills: 0 | Status: DISCONTINUED | COMMUNITY
Start: 2023-07-24 | End: 2023-08-02

## 2023-08-02 NOTE — HEALTH RISK ASSESSMENT
[0] : 2) Feeling down, depressed, or hopeless: Not at all (0) [PHQ-2 Negative - No further assessment needed] : PHQ-2 Negative - No further assessment needed [Former] : Former [< 15 Years] : < 15 Years [PBK2Qxekh] : 0

## 2023-08-02 NOTE — HISTORY OF PRESENT ILLNESS
[FreeTextEntry1] : bp and bloodwork review [de-identified] : Pt is a 52 y/o male with a PMHx of diabetes, HTN, HLD who presents for bp and f/u bloodwork. Pt has intentionally lost 5lbs and has quit smoking since last visit. His Bp at home reading this morning was 150/80, lowest Bp ever was 140 while on current regimen Denies chest pain, SOB, GILES, dizziness, diaphoresis, palpitations, LE swelling, orthopnea, syncope, n/v, headache. Denies dysuria, urinary frequency,  urgency, hematuria.

## 2023-08-21 ENCOUNTER — APPOINTMENT (OUTPATIENT)
Dept: OTOLARYNGOLOGY | Facility: CLINIC | Age: 53
End: 2023-08-21

## 2023-08-23 ENCOUNTER — APPOINTMENT (OUTPATIENT)
Dept: INTERNAL MEDICINE | Facility: CLINIC | Age: 53
End: 2023-08-23

## 2023-10-13 NOTE — ED PROVIDER NOTE - EYES, MLM
Lunch tray at patient bedside at this time. Patient currently sleeping in stretcher with side rails up.       Sky Borjas RN  10/13/23 1204 Clear bilaterally, pupils equal, round and reactive to light.

## 2023-11-22 ENCOUNTER — APPOINTMENT (OUTPATIENT)
Dept: INTERNAL MEDICINE | Facility: CLINIC | Age: 53
End: 2023-11-22
Payer: MEDICAID

## 2023-11-22 ENCOUNTER — NON-APPOINTMENT (OUTPATIENT)
Age: 53
End: 2023-11-22

## 2023-11-22 VITALS
BODY MASS INDEX: 35.78 KG/M2 | HEIGHT: 73 IN | WEIGHT: 270 LBS | HEART RATE: 74 BPM | OXYGEN SATURATION: 98 % | DIASTOLIC BLOOD PRESSURE: 118 MMHG | SYSTOLIC BLOOD PRESSURE: 192 MMHG

## 2023-11-22 VITALS — SYSTOLIC BLOOD PRESSURE: 190 MMHG | DIASTOLIC BLOOD PRESSURE: 100 MMHG

## 2023-11-22 LAB
ALBUMIN SERPL ELPH-MCNC: 4.3 G/DL
ALP BLD-CCNC: 59 U/L
ALT SERPL-CCNC: 6 U/L
ANION GAP SERPL CALC-SCNC: 14 MMOL/L
AST SERPL-CCNC: 26 U/L
BILIRUB SERPL-MCNC: 0.4 MG/DL
BUN SERPL-MCNC: 13 MG/DL
CALCIUM SERPL-MCNC: 9.6 MG/DL
CHLORIDE SERPL-SCNC: 105 MMOL/L
CHOLEST SERPL-MCNC: 182 MG/DL
CK SERPL-CCNC: 239 U/L
CO2 SERPL-SCNC: 18 MMOL/L
CREAT SERPL-MCNC: 0.79 MG/DL
EGFR: 105 ML/MIN/1.73M2
GLUCOSE SERPL-MCNC: 83 MG/DL
HDLC SERPL-MCNC: 54 MG/DL
LDLC SERPL CALC-MCNC: 114 MG/DL
NONHDLC SERPL-MCNC: 128 MG/DL
POTASSIUM SERPL-SCNC: 4.4 MMOL/L
PROT SERPL-MCNC: 7.9 G/DL
SODIUM SERPL-SCNC: 137 MMOL/L
T4 FREE SERPL-MCNC: 1.3 NG/DL
TRIGL SERPL-MCNC: 77 MG/DL
TSH SERPL-ACNC: 0.96 UIU/ML

## 2023-11-22 PROCEDURE — 93000 ELECTROCARDIOGRAM COMPLETE: CPT

## 2023-11-22 PROCEDURE — 99214 OFFICE O/P EST MOD 30 MIN: CPT | Mod: 25

## 2023-11-22 RX ORDER — AMLODIPINE AND OLMESARTAN MEDOXOMIL 10; 40 MG/1; MG/1
10-40 TABLET ORAL DAILY
Qty: 90 | Refills: 0 | Status: ACTIVE | COMMUNITY
Start: 2023-11-22 | End: 1900-01-01

## 2023-11-23 RX ORDER — AMLODIPINE BESYLATE 10 MG/1
10 TABLET ORAL
Qty: 90 | Refills: 1 | Status: DISCONTINUED | COMMUNITY
Start: 2023-07-05 | End: 2023-11-23

## 2023-11-23 RX ORDER — VALSARTAN 320 MG/1
320 TABLET, COATED ORAL DAILY
Qty: 90 | Refills: 0 | Status: DISCONTINUED | COMMUNITY
Start: 2023-07-05 | End: 2023-11-23

## 2023-11-24 LAB — ALDOSTERONE SERUM: 8.1 NG/DL

## 2023-11-26 LAB
CREAT UR-MCNC: 70.9 MG/DL
METANEPHS 24H UR-MCNC: 57 UG/L
METANEPHS/CREAT UR: 0.2
NORMETANEPHRINE 24H UR-MCNC: 84 UG/L

## 2023-11-29 ENCOUNTER — APPOINTMENT (OUTPATIENT)
Dept: INTERNAL MEDICINE | Facility: CLINIC | Age: 53
End: 2023-11-29
Payer: MEDICAID

## 2023-11-29 ENCOUNTER — APPOINTMENT (OUTPATIENT)
Dept: CARDIOLOGY | Facility: CLINIC | Age: 53
End: 2023-11-29
Payer: MEDICAID

## 2023-11-29 VITALS — DIASTOLIC BLOOD PRESSURE: 88 MMHG | SYSTOLIC BLOOD PRESSURE: 150 MMHG

## 2023-11-29 VITALS — HEIGHT: 73 IN

## 2023-11-29 VITALS
HEIGHT: 73 IN | BODY MASS INDEX: 34.66 KG/M2 | HEART RATE: 69 BPM | TEMPERATURE: 98.3 F | OXYGEN SATURATION: 97 % | SYSTOLIC BLOOD PRESSURE: 168 MMHG | DIASTOLIC BLOOD PRESSURE: 80 MMHG | WEIGHT: 261.5 LBS

## 2023-11-29 DIAGNOSIS — E78.5 HYPERLIPIDEMIA, UNSPECIFIED: ICD-10-CM

## 2023-11-29 DIAGNOSIS — R79.89 OTHER SPECIFIED ABNORMAL FINDINGS OF BLOOD CHEMISTRY: ICD-10-CM

## 2023-11-29 DIAGNOSIS — I10 ESSENTIAL (PRIMARY) HYPERTENSION: ICD-10-CM

## 2023-11-29 DIAGNOSIS — I77.810 THORACIC AORTIC ECTASIA: ICD-10-CM

## 2023-11-29 DIAGNOSIS — R74.8 ABNORMAL LEVELS OF OTHER SERUM ENZYMES: ICD-10-CM

## 2023-11-29 DIAGNOSIS — Q21.12 PATENT FORAMEN OVALE: ICD-10-CM

## 2023-11-29 DIAGNOSIS — E66.9 OBESITY, UNSPECIFIED: ICD-10-CM

## 2023-11-29 DIAGNOSIS — R29.818 OTHER SYMPTOMS AND SIGNS INVOLVING THE NERVOUS SYSTEM: ICD-10-CM

## 2023-11-29 DIAGNOSIS — E11.9 TYPE 2 DIABETES MELLITUS W/OUT COMPLICATIONS: ICD-10-CM

## 2023-11-29 DIAGNOSIS — I51.7 CARDIOMEGALY: ICD-10-CM

## 2023-11-29 PROCEDURE — 99214 OFFICE O/P EST MOD 30 MIN: CPT

## 2023-11-29 PROCEDURE — 93306 TTE W/DOPPLER COMPLETE: CPT

## 2023-11-29 RX ORDER — ASPIRIN 81 MG/1
81 TABLET ORAL
Qty: 90 | Refills: 1 | Status: ACTIVE | COMMUNITY
Start: 2023-11-29 | End: 1900-01-01

## 2023-12-04 ENCOUNTER — OUTPATIENT (OUTPATIENT)
Dept: OUTPATIENT SERVICES | Facility: HOSPITAL | Age: 53
LOS: 1 days | End: 2023-12-04
Payer: MEDICAID

## 2023-12-04 ENCOUNTER — APPOINTMENT (OUTPATIENT)
Dept: ULTRASOUND IMAGING | Facility: CLINIC | Age: 53
End: 2023-12-04
Payer: MEDICAID

## 2023-12-04 DIAGNOSIS — R29.818 OTHER SYMPTOMS AND SIGNS INVOLVING THE NERVOUS SYSTEM: ICD-10-CM

## 2023-12-04 DIAGNOSIS — Z98.890 OTHER SPECIFIED POSTPROCEDURAL STATES: Chronic | ICD-10-CM

## 2023-12-04 DIAGNOSIS — E66.9 OBESITY, UNSPECIFIED: ICD-10-CM

## 2023-12-04 DIAGNOSIS — I10 ESSENTIAL (PRIMARY) HYPERTENSION: ICD-10-CM

## 2023-12-04 DIAGNOSIS — R74.8 ABNORMAL LEVELS OF OTHER SERUM ENZYMES: ICD-10-CM

## 2023-12-04 DIAGNOSIS — E78.5 HYPERLIPIDEMIA, UNSPECIFIED: ICD-10-CM

## 2023-12-04 PROCEDURE — 93975 VASCULAR STUDY: CPT | Mod: 26

## 2023-12-04 PROCEDURE — 93975 VASCULAR STUDY: CPT

## 2023-12-05 ENCOUNTER — EMERGENCY (EMERGENCY)
Facility: HOSPITAL | Age: 53
LOS: 1 days | Discharge: HOME | End: 2023-12-05
Attending: EMERGENCY MEDICINE | Admitting: EMERGENCY MEDICINE
Payer: MEDICAID

## 2023-12-05 VITALS
RESPIRATION RATE: 18 BRPM | OXYGEN SATURATION: 93 % | TEMPERATURE: 98 F | HEART RATE: 79 BPM | DIASTOLIC BLOOD PRESSURE: 95 MMHG | SYSTOLIC BLOOD PRESSURE: 162 MMHG

## 2023-12-05 VITALS
OXYGEN SATURATION: 98 % | DIASTOLIC BLOOD PRESSURE: 85 MMHG | RESPIRATION RATE: 16 BRPM | TEMPERATURE: 98 F | SYSTOLIC BLOOD PRESSURE: 150 MMHG | HEART RATE: 70 BPM

## 2023-12-05 DIAGNOSIS — Z98.890 OTHER SPECIFIED POSTPROCEDURAL STATES: Chronic | ICD-10-CM

## 2023-12-05 PROCEDURE — 99284 EMERGENCY DEPT VISIT MOD MDM: CPT

## 2023-12-05 PROCEDURE — 93010 ELECTROCARDIOGRAM REPORT: CPT

## 2023-12-05 PROCEDURE — 99283 EMERGENCY DEPT VISIT LOW MDM: CPT

## 2023-12-05 PROCEDURE — 93005 ELECTROCARDIOGRAM TRACING: CPT

## 2023-12-05 NOTE — ED ADULT TRIAGE NOTE - CHIEF COMPLAINT QUOTE
C/O Elevated BP in AM today , Denies CP , no SOB, Further C/O Twitching of his left wrist that last for a few seconds since yesterday , no focal weakness and with clear speech

## 2023-12-05 NOTE — ED PROVIDER NOTE - PATIENT PORTAL LINK FT
"email states  " Good day Dr Milan and Dora, would it be possible if I could have PT Again, since the surgery is called off, I do get stiff time to time, thanks I think it would help me "    Ok to reorder pt?    Please advise.  Thanks dora  " You can access the FollowMyHealth Patient Portal offered by [facility] by registering at the following website: [email or web address]. By joining [employer]’s FollowMyHealth portal, you will also be able to view your health information using other applications (apps) compatible with our system. You can access the FollowMyHealth Patient Portal offered by [facility] by registering at the following website: [email or web address]. By joining [employer]’s FollowMyHealth portal, you will also be able to view your health information using other applications (apps) compatible with our system.

## 2023-12-05 NOTE — ED PROVIDER NOTE - OBJECTIVE STATEMENT
pt states that he has HTN but is controlled with medication, comes to ER bc at home he has a machine that checks hi BP and the machine said arrythmia detected and it never said that before so he was worried. pt has no complaints.

## 2023-12-05 NOTE — ED PROVIDER NOTE - NSCAREINITIATED _GEN_ER
"Requested Prescriptions   Pending Prescriptions Disp Refills     FLUoxetine (PROZAC) 20 MG capsule [Pharmacy Med Name: FLUOXETINE HCL 20 MG CAPSULE]  Last Written Prescription Date:  01/08/2018  Last Fill Quantity: 30,  # refills: 0   Last office visit: 2/14/2017 with prescribing provider:  Esthela   Future Office Visit:     30 capsule 0     Sig: TAKE 1 CAPSULE (20 MG) BY MOUTH DAILY (NEEDS FOLLOW-UP APPOINTMENT FOR THIS MEDICATION)    SSRIs Protocol Failed    4/14/2018  4:12 PM       Failed - PHQ-9 score less than 5 in past 6 months    Please review last PHQ-9 score.   PHQ-9 SCORE 4/6/2016 8/22/2016 12/9/2016   Total Score - - -   Total Score 2 8 5     JERRY-7 SCORE 6/8/2015 8/22/2016 12/9/2016   Total Score 1 - -   Total Score - 4 0              Failed - Recent (6 mo) or future (30 days) visit within the authorizing provider's specialty    Patient had office visit in the last 6 months or has a visit in the next 30 days with authorizing provider or within the authorizing provider's specialty.  See \"Patient Info\" tab in inbasket, or \"Choose Columns\" in Meds & Orders section of the refill encounter.           Passed - Patient is age 18 or older       Passed - No active pregnancy on record       Passed - No positive pregnancy test in last 12 months        Shawn RODRIGUEZ (R)    " Lilia Koroma(Attending)

## 2023-12-05 NOTE — ED ADULT NURSE NOTE - NSFALLUNIVINTERV_ED_ALL_ED
Bed/Stretcher in lowest position, wheels locked, appropriate side rails in place/Call bell, personal items and telephone in reach/Instruct patient to call for assistance before getting out of bed/chair/stretcher/Non-slip footwear applied when patient is off stretcher/[place] to call system/Physically safe environment - no spills, clutter or unnecessary equipment/Purposeful proactive rounding/Room/bathroom lighting operational, light cord in reach Bed/Stretcher in lowest position, wheels locked, appropriate side rails in place/Call bell, personal items and telephone in reach/Instruct patient to call for assistance before getting out of bed/chair/stretcher/Non-slip footwear applied when patient is off stretcher/[place] to call system/Physically safe environment - no spills, clutter or unnecessary equipment/Purposeful proactive rounding/Room/bathroom lighting operational, light cord in reach

## 2023-12-05 NOTE — ED PROVIDER NOTE - NSFOLLOWUPINSTRUCTIONS_ED_ALL_ED_FT
-- You should update your primary care physician on your Emergency Department visit and follow up with them.  If you do not have a physician or have difficulty following up, please call: [phone] to obtain a [facility] doctor or specialist who can provide follow up.    -- Return to the ER for worsening or persistent symptoms, and/or ANY NEW OR CONCERNING SYMPTOMS. -- You should update your primary care physician on your Emergency Department visit and follow up with them.  If you do not have a physician or have difficulty following up, please call: [phone] to obtain a [facility] doctor or specialist who can provide follow up.    -- Return to the ER for worsening or persistent symptoms, and/or ANY NEW OR CONCERNING SYMPTOMS.

## 2023-12-05 NOTE — ED PROVIDER NOTE - PHYSICAL EXAMINATION
Gen: alert, NAD  HEENT:  NC/AT, PERR  CV:  well perfused  Pulm:  normal RR, breathing comfortably  Abd: s/nt/nd  MSK: moving all extremities  Neuro:  non-focal  Skin:  visualized areas intact  Psych: AOx3

## 2023-12-05 NOTE — ED ADULT NURSE NOTE - OBJECTIVE STATEMENT
Patient complaining of HTN and left wrist tingling, since last night. States took medications for BP this morning and has improved, but the tingling to wrist persists and has not have it occurred despite manual work the before. Denies CP, SOB, headache, dizziness, changes in vision. Steady gait, in no acute distress at this time, Patient is AOx4, safety precautions in place, awaiting evaluation

## 2023-12-13 ENCOUNTER — APPOINTMENT (OUTPATIENT)
Dept: INTERNAL MEDICINE | Facility: CLINIC | Age: 53
End: 2023-12-13

## 2023-12-15 ENCOUNTER — APPOINTMENT (OUTPATIENT)
Dept: UROLOGY | Facility: CLINIC | Age: 53
End: 2023-12-15
Payer: MEDICAID

## 2023-12-15 DIAGNOSIS — R31.29 OTHER MICROSCOPIC HEMATURIA: ICD-10-CM

## 2023-12-15 DIAGNOSIS — N28.1 CYST OF KIDNEY, ACQUIRED: ICD-10-CM

## 2023-12-15 PROCEDURE — 99203 OFFICE O/P NEW LOW 30 MIN: CPT

## 2023-12-15 NOTE — PHYSICAL EXAM
[General Appearance - Well Developed] : well developed [General Appearance - Well Nourished] : well nourished [Normal Appearance] : normal appearance [Well Groomed] : well groomed [General Appearance - In No Acute Distress] : no acute distress [Edema] : no peripheral edema [] : no respiratory distress [Exaggerated Use Of Accessory Muscles For Inspiration] : no accessory muscle use [Normal Station and Gait] : the gait and station were normal for the patient's age [Oriented To Time, Place, And Person] : oriented to person, place, and time [Affect] : the affect was normal [Mood] : the mood was normal [Not Anxious] : not anxious [de-identified] :   Declined genital exam

## 2023-12-15 NOTE — HISTORY OF PRESENT ILLNESS
[FreeTextEntry1] : He is a 53-year-old man who is seen today for initial visit.  He generally does not have significant voiding symptoms.  There is no history of gross hematuria.  He does not complain of erectile dysfunction.  He does not smoke cigarettes.  He was told in the past to have microscopic blood in the urine.  In July 2023, PSA was 0.89, creatinine 0.79 and urinalysis showed trace blood.  However microscopic urinalysis showed 1 red blood cell.  Ultrasound showed bilateral renal cysts.

## 2023-12-15 NOTE — ASSESSMENT
[FreeTextEntry1] : He declined genital examination today.  Based on microscopic urinalysis, he does not have blood in the urine.  He is a non-smoker.  The difference between urinary dipstick and microscopic analysis was discussed.  However hematuria workup was discussed with him.  We discussed the difference between microscopic and gross hematuria. Potential benign and malignant urological conditions that can cause hematuria were reviewed. Also, non-urologic causes of hematuria were reviewed. Workup including renal imaging (ultrasonography, CT scan, MRI), urine studies and cystoscopy were reviewed. Workup based on risk stratification including age, degree of hematuria and risk factors were discussed. Risks of cystoscopy were discussed. Patient was made aware that despite adequate workup, the cause for hematuria may not be discovered and continued follow-up is recommended. Patient's questions were answered.  Therefore he will continue to monitor for now.  No intervention is needed for renal cysts at this time.  Arsenio Summers MD, FACS [facility]  of Urology  [address] [place] [address], [address] [place] [address]  Tel: [phone] Fax: [phone]

## 2023-12-15 NOTE — REVIEW OF SYSTEMS
[Dry Eyes] : dryness of the eyes [Earache] : earache [Heartburn] : heartburn [Told you have blood in urine on a urine test] : told blood was present in a urine test [Negative] : Heme/Lymph

## 2023-12-22 ENCOUNTER — APPOINTMENT (OUTPATIENT)
Dept: PULMONOLOGY | Facility: CLINIC | Age: 53
End: 2023-12-22

## 2024-07-13 ENCOUNTER — EMERGENCY (EMERGENCY)
Facility: HOSPITAL | Age: 54
LOS: 1 days | Discharge: HOME | End: 2024-07-13
Attending: EMERGENCY MEDICINE | Admitting: EMERGENCY MEDICINE
Payer: MEDICAID

## 2024-07-13 VITALS
WEIGHT: 250 LBS | DIASTOLIC BLOOD PRESSURE: 76 MMHG | HEART RATE: 67 BPM | HEIGHT: 73 IN | TEMPERATURE: 99 F | OXYGEN SATURATION: 98 % | SYSTOLIC BLOOD PRESSURE: 183 MMHG

## 2024-07-13 VITALS
TEMPERATURE: 98 F | RESPIRATION RATE: 18 BRPM | SYSTOLIC BLOOD PRESSURE: 168 MMHG | DIASTOLIC BLOOD PRESSURE: 90 MMHG | HEART RATE: 58 BPM | OXYGEN SATURATION: 100 %

## 2024-07-13 DIAGNOSIS — Z98.890 OTHER SPECIFIED POSTPROCEDURAL STATES: Chronic | ICD-10-CM

## 2024-07-13 PROCEDURE — 99284 EMERGENCY DEPT VISIT MOD MDM: CPT

## 2024-07-13 RX ORDER — LIDOCAINE HCL 28 MG/G
1 GEL TOPICAL ONCE
Refills: 0 | Status: COMPLETED | OUTPATIENT
Start: 2024-07-13 | End: 2024-07-13

## 2024-07-13 RX ORDER — AMLODIPINE BESYLATE AND OLMESARTRAN MEDOXOMIL 10; 40 MG/1; MG/1
1 TABLET, FILM COATED ORAL
Qty: 30 | Refills: 0
Start: 2024-07-13 | End: 2024-08-11

## 2024-07-13 RX ORDER — LIDOCAINE HCL 28 MG/G
1 GEL TOPICAL
Qty: 1 | Refills: 0
Start: 2024-07-13 | End: 2024-07-17

## 2024-07-13 RX ORDER — METHOCARBAMOL 500 MG
1 TABLET ORAL
Qty: 21 | Refills: 0
Start: 2024-07-13 | End: 2024-07-19

## 2024-07-13 RX ADMIN — Medication 600 MILLIGRAM(S): at 12:47

## 2024-07-13 RX ADMIN — Medication 600 MILLIGRAM(S): at 13:43

## 2024-07-13 RX ADMIN — LIDOCAINE HCL 1 PATCH: 28 GEL TOPICAL at 12:48

## 2024-08-05 ENCOUNTER — APPOINTMENT (OUTPATIENT)
Dept: INTERNAL MEDICINE | Facility: CLINIC | Age: 54
End: 2024-08-05

## 2024-08-05 PROBLEM — R00.2 PALPITATIONS: Status: ACTIVE | Noted: 2024-08-05

## 2024-08-05 PROBLEM — M25.422 SWELLING OF LEFT ELBOW: Status: ACTIVE | Noted: 2024-08-05

## 2024-08-05 PROBLEM — M54.50 LOW BACK PAIN RADIATING TO RIGHT LOWER EXTREMITY: Status: ACTIVE | Noted: 2024-08-05

## 2024-08-05 PROCEDURE — 99204 OFFICE O/P NEW MOD 45 MIN: CPT

## 2024-08-05 PROCEDURE — G2211 COMPLEX E/M VISIT ADD ON: CPT | Mod: NC

## 2024-08-05 NOTE — HEALTH RISK ASSESSMENT
[0] : 2) Feeling down, depressed, or hopeless: Not at all (0) [PHQ-2 Negative - No further assessment needed] : PHQ-2 Negative - No further assessment needed [Never] : Never [HTT4Rxtae] : 0

## 2024-08-05 NOTE — HISTORY OF PRESENT ILLNESS
[FreeTextEntry1] : f/u multiple issues [de-identified] : This is a 55 y/o male with a PMHx of diabetes, HTN, HLD who presents f/u. Pt reports gassiness after stopping smoking since last visit. Mentions that this happens every time he tries to stop smoking. Reports bump under left elbow after keep his arm against his car while driving, says swelling decreased over past few weeks but not gone, denies f/c, redness. Denies chest pain, SOB, GILES, dizziness, diaphoresis, LE swelling, orthopnea, syncope, n/v, headache.  Reports some palpitations

## 2024-08-05 NOTE — HEALTH RISK ASSESSMENT
[0] : 2) Feeling down, depressed, or hopeless: Not at all (0) [PHQ-2 Negative - No further assessment needed] : PHQ-2 Negative - No further assessment needed [Never] : Never [EBK2Fzpze] : 0

## 2024-08-05 NOTE — ADDENDUM
[FreeTextEntry1] : This note was written by Lilibeth Cat on 08/05/2024 acting as medical scribe for Dr. Bisi Esquivel. I, Dr. Bisi Esquivel, have read and attest that all the information, medical decision making and discharge instructions within are true and accurate.

## 2024-08-05 NOTE — PHYSICAL EXAM
[No Acute Distress] : no acute distress [Well Nourished] : well nourished [Well Developed] : well developed [Well-Appearing] : well-appearing [Normal Sclera/Conjunctiva] : normal sclera/conjunctiva [PERRL] : pupils equal round and reactive to light [EOMI] : extraocular movements intact [Normal Outer Ear/Nose] : the outer ears and nose were normal in appearance [Normal Oropharynx] : the oropharynx was normal [No JVD] : no jugular venous distention [No Lymphadenopathy] : no lymphadenopathy [Supple] : supple [Thyroid Normal, No Nodules] : the thyroid was normal and there were no nodules present [No Respiratory Distress] : no respiratory distress  [No Accessory Muscle Use] : no accessory muscle use [Clear to Auscultation] : lungs were clear to auscultation bilaterally [Normal Rate] : normal rate  [Regular Rhythm] : with a regular rhythm [Normal S1, S2] : normal S1 and S2 [No Murmur] : no murmur heard [No Carotid Bruits] : no carotid bruits [No Abdominal Bruit] : a ~M bruit was not heard ~T in the abdomen [No Varicosities] : no varicosities [Pedal Pulses Present] : the pedal pulses are present [No Edema] : there was no peripheral edema [No Palpable Aorta] : no palpable aorta [No Extremity Clubbing/Cyanosis] : no extremity clubbing/cyanosis [Soft] : abdomen soft [Non Tender] : non-tender [Non-distended] : non-distended [No Masses] : no abdominal mass palpated [No HSM] : no HSM [Normal Bowel Sounds] : normal bowel sounds [Normal Posterior Cervical Nodes] : no posterior cervical lymphadenopathy [Normal Anterior Cervical Nodes] : no anterior cervical lymphadenopathy [No CVA Tenderness] : no CVA  tenderness [No Spinal Tenderness] : no spinal tenderness [No Joint Swelling] : no joint swelling [Grossly Normal Strength/Tone] : grossly normal strength/tone [No Rash] : no rash [Coordination Grossly Intact] : coordination grossly intact [No Focal Deficits] : no focal deficits [Normal Gait] : normal gait [Normal Affect] : the affect was normal [Normal Insight/Judgement] : insight and judgment were intact [Alert and Oriented x3] : oriented to person, place, and time [de-identified] : left elbow with small effusion, nontender, not red or hot

## 2024-08-05 NOTE — PHYSICAL EXAM
[No Acute Distress] : no acute distress [Well Nourished] : well nourished [Well Developed] : well developed [Well-Appearing] : well-appearing [Normal Sclera/Conjunctiva] : normal sclera/conjunctiva [PERRL] : pupils equal round and reactive to light [EOMI] : extraocular movements intact [Normal Outer Ear/Nose] : the outer ears and nose were normal in appearance [Normal Oropharynx] : the oropharynx was normal [No JVD] : no jugular venous distention [No Lymphadenopathy] : no lymphadenopathy [Supple] : supple [Thyroid Normal, No Nodules] : the thyroid was normal and there were no nodules present [No Respiratory Distress] : no respiratory distress  [No Accessory Muscle Use] : no accessory muscle use [Clear to Auscultation] : lungs were clear to auscultation bilaterally [Normal Rate] : normal rate  [Regular Rhythm] : with a regular rhythm [Normal S1, S2] : normal S1 and S2 [No Murmur] : no murmur heard [No Carotid Bruits] : no carotid bruits [No Abdominal Bruit] : a ~M bruit was not heard ~T in the abdomen [No Varicosities] : no varicosities [Pedal Pulses Present] : the pedal pulses are present [No Edema] : there was no peripheral edema [No Palpable Aorta] : no palpable aorta [No Extremity Clubbing/Cyanosis] : no extremity clubbing/cyanosis [Soft] : abdomen soft [Non Tender] : non-tender [Non-distended] : non-distended [No Masses] : no abdominal mass palpated [No HSM] : no HSM [Normal Bowel Sounds] : normal bowel sounds [Normal Posterior Cervical Nodes] : no posterior cervical lymphadenopathy [Normal Anterior Cervical Nodes] : no anterior cervical lymphadenopathy [No CVA Tenderness] : no CVA  tenderness [No Spinal Tenderness] : no spinal tenderness [No Joint Swelling] : no joint swelling [Grossly Normal Strength/Tone] : grossly normal strength/tone [No Rash] : no rash [Coordination Grossly Intact] : coordination grossly intact [No Focal Deficits] : no focal deficits [Normal Gait] : normal gait [Normal Affect] : the affect was normal [Normal Insight/Judgement] : insight and judgment were intact [Alert and Oriented x3] : oriented to person, place, and time [de-identified] : left elbow with small effusion, nontender, not red or hot

## 2024-08-05 NOTE — HISTORY OF PRESENT ILLNESS
[FreeTextEntry1] : f/u multiple issues [de-identified] : This is a 55 y/o male with a PMHx of diabetes, HTN, HLD who presents f/u. Pt reports gassiness after stopping smoking since last visit. Mentions that this happens every time he tries to stop smoking. Reports bump under left elbow after keep his arm against his car while driving, says swelling decreased over past few weeks but not gone, denies f/c, redness. Denies chest pain, SOB, GILES, dizziness, diaphoresis, LE swelling, orthopnea, syncope, n/v, headache.  Reports some palpitations

## 2024-08-13 ENCOUNTER — APPOINTMENT (OUTPATIENT)
Dept: ORTHOPEDIC SURGERY | Facility: CLINIC | Age: 54
End: 2024-08-13

## 2024-08-20 ENCOUNTER — NON-APPOINTMENT (OUTPATIENT)
Age: 54
End: 2024-08-20

## 2024-08-21 ENCOUNTER — APPOINTMENT (OUTPATIENT)
Dept: PEDIATRIC ORTHOPEDIC SURGERY | Facility: CLINIC | Age: 54
End: 2024-08-21
Payer: MEDICAID

## 2024-08-21 VITALS
DIASTOLIC BLOOD PRESSURE: 100 MMHG | HEIGHT: 73 IN | BODY MASS INDEX: 33.8 KG/M2 | SYSTOLIC BLOOD PRESSURE: 150 MMHG | TEMPERATURE: 97 F | WEIGHT: 255 LBS

## 2024-08-21 DIAGNOSIS — M19.022 PRIMARY OSTEOARTHRITIS, LEFT ELBOW: ICD-10-CM

## 2024-08-21 DIAGNOSIS — M70.22 OLECRANON BURSITIS, LEFT ELBOW: ICD-10-CM

## 2024-08-21 DIAGNOSIS — Z86.79 PERSONAL HISTORY OF OTHER DISEASES OF THE CIRCULATORY SYSTEM: ICD-10-CM

## 2024-08-21 PROCEDURE — 73080 X-RAY EXAM OF ELBOW: CPT | Mod: LT

## 2024-08-21 PROCEDURE — 99202 OFFICE O/P NEW SF 15 MIN: CPT | Mod: 25

## 2024-08-21 NOTE — PHYSICAL EXAM
[de-identified] : Examination today reveals a mild olecranon bursitis present it is nontender with no erythema or increased warmth.  Nothing to suggest infection.  There are "rice bodies" present on palpation.  He has full rotation present he lacks approximately 10 degrees of full flexion and 15 degrees to full extension.  There is no pain on palpation of the elbow no clicking or popping.  Neurovascular status of extremity is unremarkable.  X-rays ordered and taken today of the left elbow reveal he is status post fracture of the distal humerus he does have moderate arthritic changes present with bone spurs present.  No obvious loose bodies.

## 2024-08-21 NOTE — ASSESSMENT
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  Insofar as he is functioning very well has no significant complaints of pain I have advised observation only.  He has been made aware aspiration of the elbow does not guarantee success and the fact that is likely the fluid would recur.  He is not interested in aspiration.  I recommended avoiding microtrauma with regards to hard surfaces when he places his elbow down.  He will return on appearing basis

## 2024-08-21 NOTE — CONSULT LETTER
[Dear  ___] : Dear  [unfilled], [Consult Letter:] : I had the pleasure of evaluating your patient, [unfilled]. [Please see my note below.] : Please see my note below. [Consult Closing:] : Thank you very much for allowing me to participate in the care of this patient.  If you have any questions, please do not hesitate to contact me. [Sincerely,] : Sincerely, [FreeTextEntry3] : Dr Massimo El JR.

## 2024-08-21 NOTE — HISTORY OF PRESENT ILLNESS
[de-identified] : This 54-year-old pleasant gentleman is seen for evaluation of his left elbow.  He recently noted swelling along the posterior aspect of the elbow with no obvious traumatic event other than he states he does a lot of driving and does rest his elbow on the door.  This again has caused swelling which has been painless.  He is functional with regards to use of the extremity.  He does have a remote history of fracture of the elbow at 7 years of age.  This was treated with pins and over the long-term again he has done well.  He over the past couple of years has noted some stiffness to the elbow but once again no pain.  No clicking popping or sensation of instability.  Past history includes hypertension for which she is on medications

## 2024-08-22 ENCOUNTER — NON-APPOINTMENT (OUTPATIENT)
Age: 54
End: 2024-08-22

## 2024-08-22 ENCOUNTER — APPOINTMENT (OUTPATIENT)
Dept: INTERNAL MEDICINE | Facility: CLINIC | Age: 54
End: 2024-08-22
Payer: MEDICAID

## 2024-08-22 VITALS
SYSTOLIC BLOOD PRESSURE: 160 MMHG | BODY MASS INDEX: 33.93 KG/M2 | WEIGHT: 256 LBS | TEMPERATURE: 98.3 F | HEIGHT: 73 IN | OXYGEN SATURATION: 98 % | DIASTOLIC BLOOD PRESSURE: 100 MMHG | HEART RATE: 90 BPM

## 2024-08-22 VITALS — SYSTOLIC BLOOD PRESSURE: 155 MMHG | DIASTOLIC BLOOD PRESSURE: 95 MMHG

## 2024-08-22 DIAGNOSIS — I10 ESSENTIAL (PRIMARY) HYPERTENSION: ICD-10-CM

## 2024-08-22 DIAGNOSIS — I77.810 THORACIC AORTIC ECTASIA: ICD-10-CM

## 2024-08-22 DIAGNOSIS — Q21.12 PATENT FORAMEN OVALE: ICD-10-CM

## 2024-08-22 DIAGNOSIS — I51.7 CARDIOMEGALY: ICD-10-CM

## 2024-08-22 PROCEDURE — G2211 COMPLEX E/M VISIT ADD ON: CPT | Mod: NC

## 2024-08-22 PROCEDURE — 99214 OFFICE O/P EST MOD 30 MIN: CPT | Mod: 25

## 2024-08-22 PROCEDURE — 93000 ELECTROCARDIOGRAM COMPLETE: CPT

## 2024-08-22 RX ORDER — APIXABAN 5 MG/1
5 TABLET, FILM COATED ORAL
Qty: 180 | Refills: 2 | Status: ACTIVE | COMMUNITY
Start: 2024-08-22 | End: 1900-01-01

## 2024-08-23 ENCOUNTER — NON-APPOINTMENT (OUTPATIENT)
Age: 54
End: 2024-08-23

## 2024-08-23 ENCOUNTER — APPOINTMENT (OUTPATIENT)
Dept: ELECTROPHYSIOLOGY | Facility: CLINIC | Age: 54
End: 2024-08-23

## 2024-08-23 VITALS
DIASTOLIC BLOOD PRESSURE: 84 MMHG | OXYGEN SATURATION: 99 % | HEART RATE: 91 BPM | HEIGHT: 73 IN | BODY MASS INDEX: 33.66 KG/M2 | SYSTOLIC BLOOD PRESSURE: 183 MMHG | WEIGHT: 254 LBS

## 2024-08-23 DIAGNOSIS — I48.91 UNSPECIFIED ATRIAL FIBRILLATION: ICD-10-CM

## 2024-08-23 PROBLEM — R14.0 GASSINESS: Status: ACTIVE | Noted: 2024-08-05

## 2024-08-23 PROCEDURE — 99204 OFFICE O/P NEW MOD 45 MIN: CPT | Mod: 25

## 2024-08-23 PROCEDURE — 93000 ELECTROCARDIOGRAM COMPLETE: CPT

## 2024-08-23 NOTE — PHYSICAL EXAM
[No Acute Distress] : no acute distress [Well Nourished] : well nourished [Well Developed] : well developed [Well-Appearing] : well-appearing [Normal Sclera/Conjunctiva] : normal sclera/conjunctiva [PERRL] : pupils equal round and reactive to light [EOMI] : extraocular movements intact [Normal Outer Ear/Nose] : the outer ears and nose were normal in appearance [Normal Oropharynx] : the oropharynx was normal [No JVD] : no jugular venous distention [No Lymphadenopathy] : no lymphadenopathy [Supple] : supple [Thyroid Normal, No Nodules] : the thyroid was normal and there were no nodules present [No Respiratory Distress] : no respiratory distress  [No Accessory Muscle Use] : no accessory muscle use [Clear to Auscultation] : lungs were clear to auscultation bilaterally [Normal Rate] : normal rate  [Regular Rhythm] : with a regular rhythm [Normal S1, S2] : normal S1 and S2 [No Murmur] : no murmur heard [No Carotid Bruits] : no carotid bruits [No Varicosities] : no varicosities [Pedal Pulses Present] : the pedal pulses are present [No Edema] : there was no peripheral edema [No Extremity Clubbing/Cyanosis] : no extremity clubbing/cyanosis [Soft] : abdomen soft [Non Tender] : non-tender [Non-distended] : non-distended [No Masses] : no abdominal mass palpated [Normal Bowel Sounds] : normal bowel sounds [Normal Posterior Cervical Nodes] : no posterior cervical lymphadenopathy [Normal Anterior Cervical Nodes] : no anterior cervical lymphadenopathy [No CVA Tenderness] : no CVA  tenderness [No Spinal Tenderness] : no spinal tenderness [No Joint Swelling] : no joint swelling [Grossly Normal Strength/Tone] : grossly normal strength/tone [No Rash] : no rash [Coordination Grossly Intact] : coordination grossly intact [No Focal Deficits] : no focal deficits [Normal Gait] : normal gait [Normal Affect] : the affect was normal [Alert and Oriented x3] : oriented to person, place, and time [Normal Insight/Judgement] : insight and judgment were intact

## 2024-08-23 NOTE — HISTORY OF PRESENT ILLNESS
[FreeTextEntry1] : called pt to come to office given new onset Afib on holter [de-identified] : This is a 55 y/o male with a PMHx of diabetes, HTN, HLD who presents for f/u. Pt was on a Holter monitor that showed new onset afib 10.6% burden. Longest event was 11 hrs and 30 mins on day 3, his average heart rate was 61 ..  He says he completely stopped smoking marijuana but uses olive oil extraction that helps him to sleep. Today he said he was burping a lot and was passing gas. Overall, pt feels well. Denies chest pa Denies  dizziness, diaphoresis, palpitations, LE swelling, orthopnea, syncope, n/v, headache. Pt never started dyazide from PCP office, bp remains high

## 2024-08-23 NOTE — HEALTH RISK ASSESSMENT
[0] : 2) Feeling down, depressed, or hopeless: Not at all (0) [PHQ-2 Negative - No further assessment needed] : PHQ-2 Negative - No further assessment needed [Never] : Never [[id number]] : 0

## 2024-08-23 NOTE — HEALTH RISK ASSESSMENT
[0] : 2) Feeling down, depressed, or hopeless: Not at all (0) [PHQ-2 Negative - No further assessment needed] : PHQ-2 Negative - No further assessment needed [Never] : Never [[id number]] : 0

## 2024-08-23 NOTE — HISTORY OF PRESENT ILLNESS
[FreeTextEntry1] : called pt to come to office given new onset Afib on holter [de-identified] : This is a 53 y/o male with a PMHx of diabetes, HTN, HLD who presents for f/u. Pt was on a Holter monitor that showed new onset afib 10.6% burden. Longest event was 11 hrs and 30 mins on day 3, his average heart rate was 61 ..  He says he completely stopped smoking marijuana but uses olive oil extraction that helps him to sleep. Today he said he was burping a lot and was passing gas. Overall, pt feels well. Denies chest pa Denies  dizziness, diaphoresis, palpitations, LE swelling, orthopnea, syncope, n/v, headache. Pt never started dyazide from PCP office, bp remains high

## 2024-08-25 PROCEDURE — 93241 XTRNL ECG REC>48HR<7D: CPT

## 2024-08-27 ENCOUNTER — APPOINTMENT (OUTPATIENT)
Dept: GASTROENTEROLOGY | Facility: CLINIC | Age: 54
End: 2024-08-27
Payer: MEDICAID

## 2024-08-27 ENCOUNTER — OUTPATIENT (OUTPATIENT)
Dept: OUTPATIENT SERVICES | Facility: HOSPITAL | Age: 54
LOS: 1 days | End: 2024-08-27
Payer: SELF-PAY

## 2024-08-27 ENCOUNTER — APPOINTMENT (OUTPATIENT)
Dept: CT IMAGING | Facility: CLINIC | Age: 54
End: 2024-08-27
Payer: MEDICAID

## 2024-08-27 ENCOUNTER — APPOINTMENT (OUTPATIENT)
Dept: CT IMAGING | Facility: CLINIC | Age: 54
End: 2024-08-27

## 2024-08-27 ENCOUNTER — OUTPATIENT (OUTPATIENT)
Dept: OUTPATIENT SERVICES | Facility: HOSPITAL | Age: 54
LOS: 1 days | End: 2024-08-27
Payer: MEDICAID

## 2024-08-27 VITALS
RESPIRATION RATE: 14 BRPM | OXYGEN SATURATION: 99 % | WEIGHT: 257 LBS | DIASTOLIC BLOOD PRESSURE: 84 MMHG | HEIGHT: 73 IN | SYSTOLIC BLOOD PRESSURE: 160 MMHG | TEMPERATURE: 97 F | BODY MASS INDEX: 34.06 KG/M2 | HEART RATE: 90 BPM

## 2024-08-27 DIAGNOSIS — Z00.8 ENCOUNTER FOR OTHER GENERAL EXAMINATION: ICD-10-CM

## 2024-08-27 DIAGNOSIS — Z86.79 PERSONAL HISTORY OF OTHER DISEASES OF THE CIRCULATORY SYSTEM: ICD-10-CM

## 2024-08-27 DIAGNOSIS — R14.3 FLATULENCE: ICD-10-CM

## 2024-08-27 DIAGNOSIS — Z98.890 OTHER SPECIFIED POSTPROCEDURAL STATES: Chronic | ICD-10-CM

## 2024-08-27 DIAGNOSIS — K59.09 OTHER CONSTIPATION: ICD-10-CM

## 2024-08-27 DIAGNOSIS — E78.5 HYPERLIPIDEMIA, UNSPECIFIED: ICD-10-CM

## 2024-08-27 DIAGNOSIS — R14.0 ABDOMINAL DISTENSION (GASEOUS): ICD-10-CM

## 2024-08-27 DIAGNOSIS — I77.810 THORACIC AORTIC ECTASIA: ICD-10-CM

## 2024-08-27 PROCEDURE — 71250 CT THORAX DX C-: CPT | Mod: 26

## 2024-08-27 PROCEDURE — 99204 OFFICE O/P NEW MOD 45 MIN: CPT

## 2024-08-27 PROCEDURE — 75571 CT HRT W/O DYE W/CA TEST: CPT

## 2024-08-27 PROCEDURE — 75571 CT HRT W/O DYE W/CA TEST: CPT | Mod: 26,59

## 2024-08-27 PROCEDURE — 71250 CT THORAX DX C-: CPT

## 2024-08-27 PROCEDURE — 75571 CT HRT W/O DYE W/CA TEST: CPT | Mod: 26

## 2024-08-27 NOTE — CARDIOLOGY SUMMARY
[de-identified] : 8/23/2024: Sinus rhythm at 71 bpm [de-identified] : 11/29/2023: 1. Left ventricular cavity is normal. Left ventricular wall thickness is mildly increased. Left ventricular systolic function is normal with an ejection fraction of 71 % by Mcnulty's method of disks. 2. There is mild (grade 1) left ventricular diastolic dysfunction. 3. There is normal LV mass and concentric remodeling. 4. The left atrium is moderately dilated. 5. Prominent Eustachian valve in the right atrium. 6. Hypermobile inter-atrial septum. 7. Patent foramen ovale with left to right shunting by color flow Doppler. 8. Ascending aorta diameter is dilated, measuring 3.80 cm (indexed 1.55 cm/m). 9. Mild calcification of the aortic valve leaflets. 10. Trace aortic regurgitation. 11. Mild pulmonic regurgitation. 12. There is mild posterior calcification of the mitral valve annulus. 13. Trace mitral regurgitation. 14. Trace-mild tricuspid regurgitation. 15. Estimated pulmonary artery systolic pressure is 30 mmHg, consistent with normal pulmonary artery pressure. 16. No pericardial effusion seen.

## 2024-08-27 NOTE — ASSESSMENT
[FreeTextEntry1] : Start Linzess 145 mcg daily 1/2-hour before breakfast  Office follow-up in 1 to 2-month  If still symptomatic on Linzess, then I will recommend that he have a colonoscopy   I spent 46 minutes with the patient and answered all of his questions

## 2024-08-27 NOTE — DISCUSSION/SUMMARY
[FreeTextEntry1] : In summary, this is a 54 year old man with new found paroxysmal afib. He is having recurrent paroxysmal afib, lasting up to 12 hours in duration. We discussed treatment options for afib including rate control vs antiarrhythmics vs possible ablation. Given his recurrent symptomatic afib and young age/preference to avoid antiarrhythmics, I recommend he undergo possible afib ablation. The risks, benefits, and alternatives to procedure were discussed at length. Risks including that of bleeding, infection, stroke, and cardiac tamponade discussed and he verbalizes understanding of all. His blood pressure is high in office today, now on new antihypertensives. Will start his new medication and work on controlling his blood pressure and follow up in office in 1 month. Once normotensive, we will plan for ablation at that time. This will also allow for at least 4 weeks of Eliquis use so no need for ERIKA on day of ablation.   Mr. Suarez appeared to understand the whole discussion and verbalized that all of his questions were answered to his satisfaction.  Thank you for allowing me to be involved in the care of this pleasant man. Please feel free to contact me with any questions. [EKG obtained to assist in diagnosis and management of assessed problem(s)] : EKG obtained to assist in diagnosis and management of assessed problem(s)

## 2024-08-27 NOTE — HISTORY OF PRESENT ILLNESS
[FreeTextEntry1] : Referring Physician: Bisi Esquivel MD    Dear Dr. Esquivel:   Mr. Suarez was seen in the [facility] today. For our records, please allow me to summarize the history and my findings.   This pleasant 54 year old man has a cardiovascular history significant for hypertension, hyperlipidemia, and new found paroxysmal atrial fibrillation. He recently underwent a Holter which revealed recurrent afib, overall 11% burden with longest episode lasting 11.5 hours in duration. He is now on Eliquis 5mg BID. Still struggling with his blood pressure, did not take his blood pressure medication yet today. He is very concerned about his new afib. His EKG reveals NSR today.    Mr. Suarez denies any recent history of chest pain, shortness of breath, palpitations, dizziness, or syncope.

## 2024-08-27 NOTE — HISTORY OF PRESENT ILLNESS
[FreeTextEntry1] : Referring Physician: Bisi Esquivel MD    Dear Dr. Esquivel:   Mr. Suarez was seen in the [facility] today. For our records, please allow me to summarize the history and my findings.   This pleasant 54 year old man has a cardiovascular history significant for hypertension, hyperlipidemia, and new found paroxysmal atrial fibrillation. He recently underwent a Holter which revealed recurrent afib, overall 11% burden with longest episode lasting 11.5 hours in duration. He is now on Eliquis 5mg BID. Still struggling with his blood pressure, did not take his blood pressure medication yet today. He is very concerned about his new afib. His EKG reveals NSR today.    Mr. Suarez denies any recent history of chest pain, shortness of breath, palpitations, dizziness, or syncope.

## 2024-08-27 NOTE — HISTORY OF PRESENT ILLNESS
[FreeTextEntry1] : He is a 54-year-old male with a long history of intermittent episodes of constipation associated with incomplete evacuation, increased gas, flatulence and abdominal bloating.  He denies abdominal pain, rectal bleeding or weight loss.  He denies a family history of colon cancer.  He had a colonoscopy 5 years ago after the onset of these symptoms which were normal according to the patient.

## 2024-08-27 NOTE — CARDIOLOGY SUMMARY
[de-identified] : 8/23/2024: Sinus rhythm at 71 bpm [de-identified] : 11/29/2023: 1. Left ventricular cavity is normal. Left ventricular wall thickness is mildly increased. Left ventricular systolic function is normal with an ejection fraction of 71 % by Mcnulty's method of disks. 2. There is mild (grade 1) left ventricular diastolic dysfunction. 3. There is normal LV mass and concentric remodeling. 4. The left atrium is moderately dilated. 5. Prominent Eustachian valve in the right atrium. 6. Hypermobile inter-atrial septum. 7. Patent foramen ovale with left to right shunting by color flow Doppler. 8. Ascending aorta diameter is dilated, measuring 3.80 cm (indexed 1.55 cm/m). 9. Mild calcification of the aortic valve leaflets. 10. Trace aortic regurgitation. 11. Mild pulmonic regurgitation. 12. There is mild posterior calcification of the mitral valve annulus. 13. Trace mitral regurgitation. 14. Trace-mild tricuspid regurgitation. 15. Estimated pulmonary artery systolic pressure is 30 mmHg, consistent with normal pulmonary artery pressure. 16. No pericardial effusion seen.

## 2024-08-27 NOTE — CONSULT LETTER
[Dear  ___] : Dear  [unfilled], [Consult Letter:] : I had the pleasure of evaluating your patient, [unfilled]. [( Thank you for referring [unfilled] for consultation for _____ )] : Thank you for referring [unfilled] for consultation for [unfilled] [Please see my note below.] : Please see my note below. [Consult Closing:] : Thank you very much for allowing me to participate in the care of this patient.  If you have any questions, please do not hesitate to contact me. [Sincerely,] : Sincerely, [FreeTextEntry3] : Frederic Teague MD  Gastroenterology [facility]

## 2024-09-06 ENCOUNTER — APPOINTMENT (OUTPATIENT)
Dept: INTERNAL MEDICINE | Facility: CLINIC | Age: 54
End: 2024-09-06
Payer: MEDICAID

## 2024-09-06 ENCOUNTER — NON-APPOINTMENT (OUTPATIENT)
Age: 54
End: 2024-09-06

## 2024-09-06 VITALS — SYSTOLIC BLOOD PRESSURE: 172 MMHG | DIASTOLIC BLOOD PRESSURE: 98 MMHG

## 2024-09-06 VITALS
TEMPERATURE: 97.6 F | DIASTOLIC BLOOD PRESSURE: 100 MMHG | HEIGHT: 73 IN | HEART RATE: 74 BPM | OXYGEN SATURATION: 99 % | SYSTOLIC BLOOD PRESSURE: 180 MMHG | BODY MASS INDEX: 34.06 KG/M2 | WEIGHT: 257 LBS

## 2024-09-06 VITALS — SYSTOLIC BLOOD PRESSURE: 178 MMHG | DIASTOLIC BLOOD PRESSURE: 88 MMHG

## 2024-09-06 DIAGNOSIS — Q21.12 PATENT FORAMEN OVALE: ICD-10-CM

## 2024-09-06 DIAGNOSIS — R93.1 ABNORMAL FINDINGS ON DIAGNOSTIC IMAGING OF HEART AND CORONARY CIRCULATION: ICD-10-CM

## 2024-09-06 DIAGNOSIS — I51.7 CARDIOMEGALY: ICD-10-CM

## 2024-09-06 DIAGNOSIS — I77.810 THORACIC AORTIC ECTASIA: ICD-10-CM

## 2024-09-06 PROBLEM — J84.10 LUNG GRANULOMA: Status: ACTIVE | Noted: 2024-09-06

## 2024-09-06 PROCEDURE — G2211 COMPLEX E/M VISIT ADD ON: CPT | Mod: NC

## 2024-09-06 PROCEDURE — 99214 OFFICE O/P EST MOD 30 MIN: CPT

## 2024-09-07 LAB
ANION GAP SERPL CALC-SCNC: 13 MMOL/L
BUN SERPL-MCNC: 11 MG/DL
CALCIUM SERPL-MCNC: 9.7 MG/DL
CHLORIDE SERPL-SCNC: 102 MMOL/L
CO2 SERPL-SCNC: 24 MMOL/L
CREAT SERPL-MCNC: 0.98 MG/DL
EGFR: 90 ML/MIN/1.73M2
GLUCOSE SERPL-MCNC: 79 MG/DL
POTASSIUM SERPL-SCNC: 4.4 MMOL/L
SODIUM SERPL-SCNC: 140 MMOL/L

## 2024-09-07 NOTE — HEALTH RISK ASSESSMENT
[0] : 2) Feeling down, depressed, or hopeless: Not at all (0) [PHQ-2 Negative - No further assessment needed] : PHQ-2 Negative - No further assessment needed [Never] : Never [[id number]] : 0

## 2024-09-07 NOTE — HEALTH RISK ASSESSMENT
[0] : 2) Feeling down, depressed, or hopeless: Not at all (0) [PHQ-2 Negative - No further assessment needed] : PHQ-2 Negative - No further assessment needed [Never] : Never [[id number]] : 0

## 2024-09-07 NOTE — HISTORY OF PRESENT ILLNESS
[FreeTextEntry1] : bp check [de-identified] : Mr. GARCIA is a 55 y/o M with a PMHx of diabetes, HTN, HLD, new onset AFib who presents for bp check. Says hes compliant with  amlo/olme/dyazide. PT says his bp at home is always 120-130/80-90. Saw EP Dr Dwyer and plans for ablation Had CT calcium score and CT chest see below. Patient feels well. No complaints. Denies chest pain, sob, whittaker, dizziness, orthopnea, diaphoresis, palpitations, LE swelling, syncope, n/v, headache.

## 2024-09-07 NOTE — ADDENDUM
[FreeTextEntry1] : This note was written by Lilibeth Cat on 09/06/2024 acting as medical scribe for Dr. Bisi Esquivel. I, Dr. Bisi Esquivel, have read and attest that all the information, medical decision making and discharge instructions within are true and accurate.

## 2024-09-12 ENCOUNTER — APPOINTMENT (OUTPATIENT)
Dept: OPHTHALMOLOGY | Facility: CLINIC | Age: 54
End: 2024-09-12
Payer: MEDICAID

## 2024-09-12 ENCOUNTER — APPOINTMENT (OUTPATIENT)
Dept: PULMONOLOGY | Facility: CLINIC | Age: 54
End: 2024-09-12
Payer: MEDICAID

## 2024-09-12 ENCOUNTER — LABORATORY RESULT (OUTPATIENT)
Age: 54
End: 2024-09-12

## 2024-09-12 ENCOUNTER — NON-APPOINTMENT (OUTPATIENT)
Age: 54
End: 2024-09-12

## 2024-09-12 VITALS — SYSTOLIC BLOOD PRESSURE: 160 MMHG | OXYGEN SATURATION: 97 % | HEART RATE: 107 BPM | DIASTOLIC BLOOD PRESSURE: 89 MMHG

## 2024-09-12 DIAGNOSIS — I10 ESSENTIAL (PRIMARY) HYPERTENSION: ICD-10-CM

## 2024-09-12 DIAGNOSIS — I48.91 UNSPECIFIED ATRIAL FIBRILLATION: ICD-10-CM

## 2024-09-12 DIAGNOSIS — J84.10 PULMONARY FIBROSIS, UNSPECIFIED: ICD-10-CM

## 2024-09-12 DIAGNOSIS — E78.5 HYPERLIPIDEMIA, UNSPECIFIED: ICD-10-CM

## 2024-09-12 LAB
ALBUMIN SERPL ELPH-MCNC: 4.7 G/DL
ALP BLD-CCNC: 64 U/L
ALT SERPL-CCNC: 10 U/L
ANION GAP SERPL CALC-SCNC: 14 MMOL/L
AST SERPL-CCNC: 29 U/L
BASOPHILS # BLD AUTO: 0.04 K/UL
BASOPHILS NFR BLD AUTO: 0.8 %
BILIRUB SERPL-MCNC: 0.5 MG/DL
BUN SERPL-MCNC: 18 MG/DL
CALCIUM SERPL-MCNC: 9.6 MG/DL
CHLORIDE SERPL-SCNC: 102 MMOL/L
CK SERPL-CCNC: 237 U/L
CO2 SERPL-SCNC: 24 MMOL/L
CREAT SERPL-MCNC: 1.05 MG/DL
CRP SERPL-MCNC: <3 MG/L
EGFR: 83 ML/MIN/1.73M2
EOSINOPHIL # BLD AUTO: 0.07 K/UL
EOSINOPHIL NFR BLD AUTO: 1.4 %
ERYTHROCYTE [SEDIMENTATION RATE] IN BLOOD BY WESTERGREN METHOD: 28 MM/HR
GLUCOSE SERPL-MCNC: 109 MG/DL
HCT VFR BLD CALC: 43.5 %
HGB BLD-MCNC: 14.5 G/DL
HIV1+2 AB SPEC QL IA.RAPID: NONREACTIVE
IMM GRANULOCYTES NFR BLD AUTO: 0.2 %
LYMPHOCYTES # BLD AUTO: 2.35 K/UL
LYMPHOCYTES NFR BLD AUTO: 48.7 %
MAN DIFF?: NORMAL
MCHC RBC-ENTMCNC: 28.3 PG
MCHC RBC-ENTMCNC: 33.3 GM/DL
MCV RBC AUTO: 84.8 FL
MONOCYTES # BLD AUTO: 0.64 K/UL
MONOCYTES NFR BLD AUTO: 13.3 %
NEUTROPHILS # BLD AUTO: 1.72 K/UL
NEUTROPHILS NFR BLD AUTO: 35.6 %
PLATELET # BLD AUTO: 132 K/UL
POTASSIUM SERPL-SCNC: 3.8 MMOL/L
PROT SERPL-MCNC: 7.6 G/DL
RBC # BLD: 5.13 M/UL
RBC # FLD: 14.6 %
RHEUMATOID FACT SER QL: 11 IU/ML
SODIUM SERPL-SCNC: 140 MMOL/L
WBC # FLD AUTO: 4.83 K/UL

## 2024-09-12 PROCEDURE — 99204 OFFICE O/P NEW MOD 45 MIN: CPT | Mod: 25

## 2024-09-12 PROCEDURE — ZZZZZ: CPT

## 2024-09-12 PROCEDURE — 94727 GAS DIL/WSHOT DETER LNG VOL: CPT

## 2024-09-12 PROCEDURE — 94729 DIFFUSING CAPACITY: CPT

## 2024-09-12 PROCEDURE — 36415 COLL VENOUS BLD VENIPUNCTURE: CPT

## 2024-09-12 PROCEDURE — 95012 NITRIC OXIDE EXP GAS DETER: CPT

## 2024-09-12 PROCEDURE — 94010 BREATHING CAPACITY TEST: CPT

## 2024-09-12 PROCEDURE — 92004 COMPRE OPH EXAM NEW PT 1/>: CPT

## 2024-09-12 NOTE — PROCEDURE
[FreeTextEntry1] : Veinipuncture done in office today.  ----------------- PFT performed today, Sep 12 2024  8:30AM in my office . Spirometry: Within normal limits Diffusion: Within normal limits Lung Volume: Within normal limits -----------  Exhaled Nitric Oxide             Final  No Documents Attached    	Test  	Result  	Flag	Reference	Goal	Last Verified  	Exhaled Nitric Oxide	9	 	 		REQUIRED  Ordered by: FRANCISCO ESPINOZA       Collected/Examined: 12Sep2024 10:05AM       Verification Required       Stage: Final       Performed at: In Office       Performed by: FRANCISCO ESPINOZA       Resulted: 12Sep2024 10:05AM       Last Updated: 12Sep2024 10:05AM          _____ EXAM: [id number] - CT CHEST  - ORDERED BY: KENDRA YEAGER   PROCEDURE DATE:  08/27/2024    INTERPRETATION:  INDICATION: Dilated aorta  TECHNIQUE: Volumetric images of the chest without intravenous contrast. Maximum intensity projection images were generated.  COMPARISON: CT calcium score 8/27/2024.  FINDINGS:  CARDIOVASCULAR, MEDIASTINUM:  Vasculature: Thoracic aorta: Normal in caliber and course. The aorta measures (all measurements are in centimeters): *  At the sinuses of valsalva:  3.4 x 3.3 x 3.3. *  At the sinotubular junction:  3.2 x 3.1. *  Ascending thoracic aorta at the main pulmonary artery:  3.6 x 3.6. *  At the aortic isthmus:  3.1 x 3.1. *  Descending thoracic aorta at the main pulmonary artery:  3.0 x 3.0 . *  Descending thoracic aorta at the diaphragmatic hiatus:  2.6 x 2.6.  Heart and pericardium: Normal heart size. No pericardial effusion.  Esophagus: Normally decompressed.  LYMPH NODES: No mediastinal or axillary lymphadenopathy. No gross hilar adenopathy.  LUNGS/AIRWAYS/PLEURA: Patent central airways. There is no focal consolidation. Calcified granulomas. No pleural effusion or pneumothorax.  UPPER ABDOMEN: Evaluation of the partially-imaged upper abdomen demonstrates a left renal cyst.  BONES: No acute abnormality.  IMPRESSION:  No aortic aneurysm.  --- End of Report ---       CHRISTY PADILLA DO; Attending Radiologist This document has been electronically signed. Sep  6 2024  1:59PM

## 2024-09-12 NOTE — REASON FOR VISIT
Please call     I had a chance to review his back x-ray results  He does have some arthritis in the joints of the back (facet joints close)  I think this is the cause of the pain when he stands.  He also has a little problem where 1 vertebrae slips forward the other 1    This is called spondylolisthesis.  Again I think this is why he gets the right leg pain when he stands.  Once he gets moving things probably get back into alignment    Strength in his back and abdominal muscles is still the treatment choice for this problem  He hopefully is seeing some improvement with exercise and with using the diclofenac     [Consultation] : a consultation [TextBox_13] : Dr. Esquivel

## 2024-09-12 NOTE — ADDENDUM
[FreeTextEntry1] : I, Noel Siegel, acted solely as a scribe for Dr. Yulia Lopez D.O. on this date 09/12/2024.   All medical record entries made by the Scribe were at my, Dr. Yulia Lopez D.O., direction and personally dictated by me on 09/12/2024. I have reviewed the chart and agree that the record accurately reflects my personal performance of the history, physical exam, assessment and plan. I have also personally directed, reviewed, and agreed with the chart.

## 2024-09-12 NOTE — CONSULT LETTER
[Dear  ___] : Dear  [unfilled], [Courtesy Letter:] : I had the pleasure of seeing your patient, [unfilled], in my office today. [Please see my note below.] : Please see my note below. [Consult Closing:] : Thank you very much for allowing me to participate in the care of this patient.  If you have any questions, please do not hesitate to contact me. [Sincerely,] : Sincerely, [FreeTextEntry3] : Dr. Yulia Lopez

## 2024-09-12 NOTE — ASSESSMENT
[FreeTextEntry1] : Provider informed patient the chest CT scan was indicative of postinflammatory changes/scars.  Provider informed the patient the scaring is likely from a prior infection or sarcoidosis. Patient noted that he previously smoked marijuana daily, so provider considers this the likely cause of the scarring.  Provider discussed that sarcoidosis could also be a cause of the nodules, especially considering patient is   PFT, NIOX and blood test will be conducted to rule out sarcoidosis and asthma, as well as to test lung function give prior history of smoking marijuana.  Provider recommended patient should get nicotine patch to help manage nicotine withdrawal.  Patient asked about using CBD but provider strongly recommended against it.  Patient should follow up in 6 months (3/2025) with Dr. Lopez and should speak to Dr. Esquivel about managing his returning anxiety since quitting smoking marijuana.

## 2024-09-12 NOTE — HISTORY OF PRESENT ILLNESS
[Former] : former [TextBox_4] : MARY GARCIA is a 54 year male who presents to the office for an initial evaluation. Patient was referred by Dr. Esquivel  Patient notes anxiety when visiting doctors' offices.  Patient denies prior tuberculosis infection or COVID infection  Patient notes smoking marijuana daily, the entire time.   Patient denies any breathing problems.   Patient notes a prior history of atrial fibrillation and hypertension  Patient notes taking Eliquis for history of atrial fibrillation.  Patient notes feeling anxious after ceasing smoking marijuana.    Patient denies a history of asthma.   [TextBox_27] : Alessia, quit about 3 months ago

## 2024-09-13 LAB
ENA JO1 AB SER IA-ACNC: <0.2 AL
ENA SCL70 IGG SER IA-ACNC: <0.2 AL

## 2024-09-14 LAB — ANA SER IF-ACNC: NEGATIVE

## 2024-09-16 ENCOUNTER — APPOINTMENT (OUTPATIENT)
Dept: PODIATRY | Facility: CLINIC | Age: 54
End: 2024-09-16
Payer: MEDICAID

## 2024-09-16 ENCOUNTER — TRANSCRIPTION ENCOUNTER (OUTPATIENT)
Age: 54
End: 2024-09-16

## 2024-09-16 DIAGNOSIS — Z82.49 FAMILY HISTORY OF ISCHEMIC HEART DISEASE AND OTHER DISEASES OF THE CIRCULATORY SYSTEM: ICD-10-CM

## 2024-09-16 DIAGNOSIS — Z82.3 FAMILY HISTORY OF STROKE: ICD-10-CM

## 2024-09-16 DIAGNOSIS — Z83.3 FAMILY HISTORY OF DIABETES MELLITUS: ICD-10-CM

## 2024-09-16 DIAGNOSIS — E11.49 TYPE 2 DIABETES MELLITUS WITH OTHER DIABETIC NEUROLOGICAL COMPLICATION: ICD-10-CM

## 2024-09-16 DIAGNOSIS — M77.8 OTHER ENTHESOPATHIES, NOT ELSEWHERE CLASSIFIED: ICD-10-CM

## 2024-09-16 DIAGNOSIS — L85.1 ACQUIRED KERATOSIS [KERATODERMA] PALMARIS ET PLANTARIS: ICD-10-CM

## 2024-09-16 DIAGNOSIS — B35.1 TINEA UNGUIUM: ICD-10-CM

## 2024-09-16 LAB
ACE BLD-CCNC: 53 U/L
ALTERN TENCAPG(M6): 3.2 MCG/ML
ASPER FUMCAPG(M3): 9.5 MCG/ML
AUREOBASCAPG(M12): 6.9 MCG/ML
LACEYELLA SACCHARI IGG: 7.1 MCG/ML
MICROPOLYCAPG(M22): 2.3 MCG/ML
PENIC CHRYCAPG(M1): 8.8 MCG/ML
PHOMA BETAE IGG: 2.4 MCG/ML
TRICHODERMA VIRIDE IGG: 3 MCG/ML

## 2024-09-16 PROCEDURE — 11720 DEBRIDE NAIL 1-5: CPT | Mod: 59

## 2024-09-16 PROCEDURE — 99203 OFFICE O/P NEW LOW 30 MIN: CPT | Mod: 25

## 2024-09-16 PROCEDURE — 11056 PARNG/CUTG B9 HYPRKR LES 2-4: CPT

## 2024-09-17 ENCOUNTER — APPOINTMENT (OUTPATIENT)
Dept: CARDIOLOGY | Facility: CLINIC | Age: 54
End: 2024-09-17

## 2024-09-17 LAB
M TB IFN-G BLD-IMP: NEGATIVE
QUANTIFERON TB PLUS MITOGEN MINUS NIL: >10 IU/ML
QUANTIFERON TB PLUS NIL: 0.02 IU/ML
QUANTIFERON TB PLUS TB1 MINUS NIL: 0 IU/ML
QUANTIFERON TB PLUS TB2 MINUS NIL: 0 IU/ML

## 2024-09-18 PROBLEM — E11.49 DM (DIABETES MELLITUS), TYPE 2 WITH NEUROLOGICAL COMPLICATIONS: Status: ACTIVE | Noted: 2024-09-18

## 2024-09-18 PROBLEM — B35.1 ONYCHOMYCOSIS: Status: ACTIVE | Noted: 2024-09-18

## 2024-09-18 PROBLEM — M77.8 CAPSULITIS OF FOOT, LEFT: Status: ACTIVE | Noted: 2024-09-18

## 2024-09-18 PROBLEM — L85.1 KERATODERMA, ACQUIRED: Status: ACTIVE | Noted: 2024-09-18

## 2024-09-19 NOTE — HISTORY OF PRESENT ILLNESS
[FreeTextEntry1] : Patient presents today for diabetic pedal exam. He states he was referred by his medical doctor and most recent hemoglobin was 5.9 and he does not know his most recent finger stick. Patient states he is off medication for his diabetes since losing weight and he has been controlling with his diet. He denies any tingling or numbness in his feet. He does have a bunion deformity on the left and hammertoe deformities bilaterally. He has seen a doctor in the past who had recommended possible surgical intervention if conservative treatment does not help. He states he wears Nike Air Max which are comfortable, but he also notices they put a little too much pressure in the forefoot and has been changed over to New Balance but since the New Balance may be too narrow, he has been complaining of pain in the lateral aspect of the left foot. He is unsure if it is correlated. Patient denies any trauma to the foot. He states he likes to walk. He has seen a podiatrist in the past but it has been a while now.

## 2024-09-19 NOTE — PHYSICAL EXAM
[2+] : left foot dorsalis pedis 2+ [Varicose Veins Of Lower Extremities] : not present [FreeTextEntry3] : Temperature gradient cooler distally. CFT: 3 seconds x10. Negative hair growth bilateral. [de-identified] : Forefoot varus bilaterally with HAV deformity, left greater than right. There is limitation of 1st MPJ ROM but negative crepitation or pain. There is pes planus deformity bilateral, left greater than right with an abduction of the foot and ankle and too many toes sign on ambulation. There is hammertoe deformities of the lesser digits 2 to 4 which are semi-rigid and adductovarus rotation of the 5th digits bilaterally.   [FreeTextEntry8] : absent vibratory. Decreased vibratory 1st MPJ. [FreeTextEntry1] : [place]-[employer] monofilament testing is intact bilaterally.

## 2024-09-19 NOTE — PHYSICAL EXAM
[2+] : left foot dorsalis pedis 2+ [Varicose Veins Of Lower Extremities] : not present [FreeTextEntry3] : Temperature gradient cooler distally. CFT: 3 seconds x10. Negative hair growth bilateral. [de-identified] : Forefoot varus bilaterally with HAV deformity, left greater than right. There is limitation of 1st MPJ ROM but negative crepitation or pain. There is pes planus deformity bilateral, left greater than right with an abduction of the foot and ankle and too many toes sign on ambulation. There is hammertoe deformities of the lesser digits 2 to 4 which are semi-rigid and adductovarus rotation of the 5th digits bilaterally.   [FreeTextEntry8] : absent vibratory. Decreased vibratory 1st MPJ. [FreeTextEntry1] : [place]-Val[employer] monofilament testing is intact bilaterally. 104

## 2024-09-19 NOTE — ASSESSMENT
[FreeTextEntry1] : Impression: Diabetic with neurological manifestations. Onychomycosis. IPK.  Capsulitis left foot.  Treatment: Discussed findings and conditions with the patient. Discussed diabetic pedal care. He is to visually check his feet daily. Avoid walking barefoot. Avoid hazardous pedal conditions such as tight-fitting shoes, ill-fitting shoes, flats, slippers while in the house and walking barefoot. He is to moisturize his feet daily and avoid excessive moisturizer interdigitally.  All nails were prepped and mycotic nails 2 and 3 bilaterally were manually debrided and trimmed to patient's tolerance and appropriate hygienic length to decrease the height of the nail, to reduce risk of subungual ulceration secondary to bacterial infections. Remainder of nails were trimmed with a sterile nipper. Subungual debris was curettaged of 2nd and 3rd nails. Height of the nails were decreased with a rotary lea and remainder of nails were smoothed. The lesions of the left foot were prepped with alcohol submet. 5 and lateral aspect of the left foot, and trimmed with a sterile #15 blade without incidence.  Discussed also shoe gear modifications with the patient. He is wearing New Balance shoes which he states had been excessively worn on the left foot on the lateral side and is inquiring about further recommendations. Discussed shoe gear with a rounder toe box and a wider midfoot to help accommodate his foot type to ensure the sole is wide for a base to help remove any excessive pressure in the lateral aspect and lateral column of the left foot. I recommended he get sized for appropriate shoe width and length as well which may help for appropriate sizing for his foot type. Patient is to return for diabetic pedal exam and in approximately 2 1/2 to 3 months for pedal evaluation as needed for the hyperkeratosis if there continues to be buildup and pain in the lateral foot. Any pain, problems or concern he is to contact the office.

## 2024-09-19 NOTE — PHYSICAL EXAM
[2+] : left foot dorsalis pedis 2+ [Varicose Veins Of Lower Extremities] : not present [FreeTextEntry3] : Temperature gradient cooler distally. CFT: 3 seconds x10. Negative hair growth bilateral. [de-identified] : Forefoot varus bilaterally with HAV deformity, left greater than right. There is limitation of 1st MPJ ROM but negative crepitation or pain. There is pes planus deformity bilateral, left greater than right with an abduction of the foot and ankle and too many toes sign on ambulation. There is hammertoe deformities of the lesser digits 2 to 4 which are semi-rigid and adductovarus rotation of the 5th digits bilaterally.   [FreeTextEntry8] : absent vibratory. Decreased vibratory 1st MPJ. [FreeTextEntry1] : [place]-Valeria monofilament testing is intact bilaterally.

## 2024-09-25 ENCOUNTER — APPOINTMENT (OUTPATIENT)
Dept: ELECTROPHYSIOLOGY | Facility: CLINIC | Age: 54
End: 2024-09-25

## 2024-09-30 ENCOUNTER — APPOINTMENT (OUTPATIENT)
Dept: DERMATOLOGY | Facility: CLINIC | Age: 54
End: 2024-09-30

## 2024-10-01 NOTE — ED PROVIDER NOTE - NS HIV RISK FACTOR YES
Quality 226: Preventive Care And Screening: Tobacco Use: Screening And Cessation Intervention: Patient screened for tobacco use and is an ex/non-smoker
Quality 110: Preventive Care And Screening: Influenza Immunization: Influenza Immunization previously received during influenza season
Detail Level: Generalized
Patient was seen seated  in chair in PT/OT preoperative assessment room with no apparent distress. Height:   5'    ; weight :   145  lbs.
Declined
4+/5/grossly assessed due to

## 2024-10-08 ENCOUNTER — APPOINTMENT (OUTPATIENT)
Dept: CARDIOLOGY | Facility: CLINIC | Age: 54
End: 2024-10-08

## 2024-10-09 ENCOUNTER — APPOINTMENT (OUTPATIENT)
Dept: ELECTROPHYSIOLOGY | Facility: CLINIC | Age: 54
End: 2024-10-09
Payer: MEDICAID

## 2024-10-09 ENCOUNTER — NON-APPOINTMENT (OUTPATIENT)
Age: 54
End: 2024-10-09

## 2024-10-09 ENCOUNTER — APPOINTMENT (OUTPATIENT)
Dept: INTERNAL MEDICINE | Facility: CLINIC | Age: 54
End: 2024-10-09
Payer: MEDICAID

## 2024-10-09 VITALS — DIASTOLIC BLOOD PRESSURE: 80 MMHG | SYSTOLIC BLOOD PRESSURE: 120 MMHG

## 2024-10-09 VITALS
DIASTOLIC BLOOD PRESSURE: 97 MMHG | HEIGHT: 73 IN | BODY MASS INDEX: 34.06 KG/M2 | HEART RATE: 88 BPM | OXYGEN SATURATION: 99 % | SYSTOLIC BLOOD PRESSURE: 168 MMHG | TEMPERATURE: 97 F | WEIGHT: 257 LBS

## 2024-10-09 VITALS
DIASTOLIC BLOOD PRESSURE: 82 MMHG | OXYGEN SATURATION: 99 % | SYSTOLIC BLOOD PRESSURE: 130 MMHG | HEART RATE: 85 BPM | WEIGHT: 257 LBS | BODY MASS INDEX: 34.06 KG/M2 | HEIGHT: 73 IN

## 2024-10-09 VITALS — DIASTOLIC BLOOD PRESSURE: 86 MMHG | SYSTOLIC BLOOD PRESSURE: 144 MMHG

## 2024-10-09 VITALS — SYSTOLIC BLOOD PRESSURE: 164 MMHG | DIASTOLIC BLOOD PRESSURE: 85 MMHG

## 2024-10-09 DIAGNOSIS — I77.810 THORACIC AORTIC ECTASIA: ICD-10-CM

## 2024-10-09 DIAGNOSIS — I10 ESSENTIAL (PRIMARY) HYPERTENSION: ICD-10-CM

## 2024-10-09 DIAGNOSIS — R93.1 ABNORMAL FINDINGS ON DIAGNOSTIC IMAGING OF HEART AND CORONARY CIRCULATION: ICD-10-CM

## 2024-10-09 DIAGNOSIS — J84.10 PULMONARY FIBROSIS, UNSPECIFIED: ICD-10-CM

## 2024-10-09 DIAGNOSIS — R74.8 ABNORMAL LEVELS OF OTHER SERUM ENZYMES: ICD-10-CM

## 2024-10-09 DIAGNOSIS — E66.9 OBESITY, UNSPECIFIED: ICD-10-CM

## 2024-10-09 DIAGNOSIS — R29.818 OTHER SYMPTOMS AND SIGNS INVOLVING THE NERVOUS SYSTEM: ICD-10-CM

## 2024-10-09 DIAGNOSIS — Q21.12 PATENT FORAMEN OVALE: ICD-10-CM

## 2024-10-09 DIAGNOSIS — I48.91 UNSPECIFIED ATRIAL FIBRILLATION: ICD-10-CM

## 2024-10-09 PROCEDURE — 93000 ELECTROCARDIOGRAM COMPLETE: CPT

## 2024-10-09 PROCEDURE — 99215 OFFICE O/P EST HI 40 MIN: CPT | Mod: 25

## 2024-10-09 PROCEDURE — G2211 COMPLEX E/M VISIT ADD ON: CPT | Mod: NC

## 2024-10-09 PROCEDURE — 99214 OFFICE O/P EST MOD 30 MIN: CPT

## 2024-10-24 ENCOUNTER — APPOINTMENT (OUTPATIENT)
Dept: CARDIOLOGY | Facility: CLINIC | Age: 54
End: 2024-10-24

## 2024-10-29 ENCOUNTER — APPOINTMENT (OUTPATIENT)
Dept: GASTROENTEROLOGY | Facility: CLINIC | Age: 54
End: 2024-10-29

## 2025-01-03 NOTE — ED ADULT NURSE NOTE - NSFALLRSKPASTHIST_ED_ALL_ED
L knee OA which impacts pts ability to perform functional tasks/transfers and mobility. Pt is scheduled for L TKR on 1/21/25. no

## 2025-01-15 ENCOUNTER — APPOINTMENT (OUTPATIENT)
Dept: INTERNAL MEDICINE | Facility: CLINIC | Age: 55
End: 2025-01-15

## 2025-03-13 ENCOUNTER — APPOINTMENT (OUTPATIENT)
Dept: PULMONOLOGY | Facility: CLINIC | Age: 55
End: 2025-03-13

## 2025-04-29 ENCOUNTER — EMERGENCY (EMERGENCY)
Facility: HOSPITAL | Age: 55
LOS: 1 days | End: 2025-04-29
Attending: EMERGENCY MEDICINE | Admitting: EMERGENCY MEDICINE
Payer: MEDICAID

## 2025-04-29 VITALS
WEIGHT: 265 LBS | RESPIRATION RATE: 18 BRPM | HEIGHT: 73 IN | OXYGEN SATURATION: 98 % | TEMPERATURE: 97 F | SYSTOLIC BLOOD PRESSURE: 143 MMHG | DIASTOLIC BLOOD PRESSURE: 84 MMHG | HEART RATE: 102 BPM

## 2025-04-29 DIAGNOSIS — Z98.890 OTHER SPECIFIED POSTPROCEDURAL STATES: Chronic | ICD-10-CM

## 2025-04-29 PROCEDURE — 99284 EMERGENCY DEPT VISIT MOD MDM: CPT | Mod: 25

## 2025-04-29 PROCEDURE — 72100 X-RAY EXAM L-S SPINE 2/3 VWS: CPT | Mod: 26

## 2025-04-29 PROCEDURE — 72100 X-RAY EXAM L-S SPINE 2/3 VWS: CPT

## 2025-04-29 PROCEDURE — 99284 EMERGENCY DEPT VISIT MOD MDM: CPT

## 2025-04-29 PROCEDURE — 73030 X-RAY EXAM OF SHOULDER: CPT

## 2025-04-29 PROCEDURE — 73030 X-RAY EXAM OF SHOULDER: CPT | Mod: 26,LT

## 2025-04-29 RX ORDER — ACETAMINOPHEN 500 MG/5ML
650 LIQUID (ML) ORAL ONCE
Refills: 0 | Status: COMPLETED | OUTPATIENT
Start: 2025-04-29 | End: 2025-04-29

## 2025-04-29 RX ADMIN — Medication 650 MILLIGRAM(S): at 14:13
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
## 2025-04-29 NOTE — ED PROVIDER NOTE - PHYSICAL EXAMINATION
Constitutional: Awake, Alert, non-toxic. NAD. Well appearing, well nourished.   HEAD: Normocephalic, atraumatic.   EYES: PERRL, EOM intact, conjunctiva and sclera are clear bilaterally. No raccoon eyes.   ENT: No patel sign. No rhinorrhea, normal pharynx, patent, no tonsillar exudate or enlargement, mucous membranes pink/moist, no erythema, no drooling or stridor.   NECK: Supple, non-tender  BACK: (+) mild lumbar TTP. No midline or paraspinal TTP of cervical/thoracic spine, FROM. No ecchymosis or hematomas.   CARDIOVASCULAR: Normal S1, S2; regular rate and rhythm.  RESPIRATORY: Normal respiratory effort; breath sounds CTAB, no wheezes, rhonchi, or rales. Speaking in full sentences. No accessory muscle use.   ABDOMEN: Soft; non-tender, non-distended   EXTREMITIES: Full passive and active ROM in all extremities; (+) minimal left shoulder TTP, ROM intact, RUE and B/L LE non-tender to palpation; distal pulses palpable and symmetric, no edema, no crepitus or step off  SKIN: Warm, dry; good skin turgor, no apparent lesions or rashes, no ecchymosis, brisk capillary refill.  NEURO: A&O x3. Sensory and motor functions are grossly intact. Speech is normal. Appearance and judgement seem appropriate for gender and age. No neurological deficits. Neurovascular sensation intact motor function 5/5 of upper and lower extremities, CN II-XII grossly intact, no ataxia, absent pronator drift, intact cerebellar function. Speech clear, without articulation or word-finding difficulties. Eyes- PERRL bilaterally. EOMs in tact. No nystagmus. No facial droop.

## 2025-04-29 NOTE — ED PROVIDER NOTE - IV ALTEPLASE INCLUSION HIDDEN
Patient presents to the clinic today to have her toenail checked.   Med rec complete.  Kaitlin Null LPN.................. 5/13/2022 1:42 PM    show

## 2025-04-29 NOTE — ED ADULT NURSE NOTE - NSICDXPASTSURGICALHX_GEN_ALL_CORE_FT
PAST SURGICAL HISTORY:  H/O elbow surgery (Left elbow, 1982)    H/O knee surgery (Left, arthroscopic 1989)    S/P colonoscopy (11/2019)
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
## 2025-04-29 NOTE — ED ADULT NURSE REASSESSMENT NOTE - NS ED NURSE REASSESS COMMENT FT1
1630:  the patient is discharged.  he is feeling better.  he was told that these symptoms will likely increase before getting better.  he can take Tylenol / Motrin alternately as needed for pain.  He can apply warm compresses / cold, whichever makes him feel better.  the patient expressed verbal understanding.  he ambulated out of the ed in stable condition, no distress and with all belongings.  vitals performed, recorded.  if his symptoms persist, he can return to the er.  We also provided him a name of an orthopedist to follow up with.  the patient verbalized understanding  ayush farris.

## 2025-04-29 NOTE — ED PROVIDER NOTE - PATIENT PORTAL LINK FT
You can access the FollowMyHealth Patient Portal offered by [facility] by registering at the following website: [email or web address]. By joining [employer]’s FollowMyHealth portal, you will also be able to view your health information using other applications (apps) compatible with our system.

## 2025-04-29 NOTE — ED PROVIDER NOTE - CARE PROVIDER_API CALL
Lamonte Philip  Orthopaedic Surgery  [address], [address]  [place] [address]  Phone: [phone]  Fax: [phone]  Follow Up Time: 1-3 Days

## 2025-04-29 NOTE — ED PROVIDER NOTE - NSFOLLOWUPINSTRUCTIONS_ED_ALL_ED_FT
1) Follow-up with Orthopedics, See referred doctor. Call today/next business day for close, prompt follow-up.  2) Return to Emergency room for any worsening or persistent pain, weakness, numbness, fever, color change to extremity, or any other concerning symptoms.  3) Take Tylenol as needed.   4) You can consider discussing with your doctor if physical therapy or further imaging as an MRI may be beneficial. 1) Follow-up with Orthopedics, See referred doctor. Call today/next business day for close, prompt follow-up.  2) Return to Emergency room for any worsening or persistent pain, weakness, numbness, fever, color change to extremity, or any other concerning symptoms.  3) Take Tylenol as needed.   4) You can consider discussing with your doctor if physical therapy or further imaging as an MRI may be beneficial.    Shoulder Pain  Many things can cause shoulder pain, including:  An injury.  Moving the shoulder in the same way again and again (overuse).  Joint pain (arthritis).  Pain can come from:  Swelling and irritation (inflammation) of any part of the shoulder.  An injury to:  The shoulder joint.  Tissues that connect muscle to bone (tendons).  Tissues that connect bones to each other (ligaments).  Bones.  Follow these instructions at home:  Watch for changes in your symptoms. Let your doctor know about them. Follow these instructions to help with your pain.    If you have a sling that can be taken off:    Wear the sling as told by your doctor. Take it off only as told by your doctor.  Check the skin around the sling every day. Tell your doctor if you see problems.  Loosen the sling if your fingers:  Tingle.  Become numb.  Become cold.  Keep the sling clean.  If the sling is not waterproof:  Do not let it get wet.  Take the sling off when you shower or bathe.  Managing pain, stiffness, and swelling    Bag of ice on a towel on the skin.  If told, put ice on the painful area.  Put ice in a plastic bag.  Place a towel between your skin and the bag.  Leave the ice on for 20 minutes, 2–3 times a day. Stop putting ice on if it does not help with the pain.  If your skin turns bright red, take off the ice right away to prevent skin damage. The risk of damage is higher if you cannot feel pain, heat, or cold.  Squeeze a soft ball or a foam pad as much as possible. This prevents swelling in the shoulder. It also helps to strengthen the arm.  General instructions    Take over-the-counter and prescription medicines only as told by your doctor.  Keep all follow-up visits. This will help you avoid any type of permanent shoulder problems.  Contact a doctor if:  Your pain gets worse.  Medicine does not help your pain.  You have new pain in your arm, hand, or fingers.  You loosen your sling and your arm, hand, or fingers:  Tingle.  Are numb.  Are swollen.  Get help right away if:  Your arm, hand, or fingers turn white or blue.  This information is not intended to replace advice given to you by your health care provider. Make sure you discuss any questions you have with your health care provider.    Document Revised: 07/21/2023 Document Reviewed: 07/21/2023  Elsevier Patient Education © 2025 [employer]
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
## 2025-04-29 NOTE — ED ADULT NURSE NOTE - NSFALLHARMRISKINTERV_ED_ALL_ED

## 2025-04-29 NOTE — ED ADULT NURSE NOTE - NSICDXPASTMEDICALHX_GEN_ALL_CORE_FT
PAST MEDICAL HISTORY:  HTN (hypertension)     Impingement syndrome of left shoulder     Type 2 diabetes mellitus

## 2025-08-05 ENCOUNTER — APPOINTMENT (OUTPATIENT)
Dept: INTERNAL MEDICINE | Facility: CLINIC | Age: 55
End: 2025-08-05
Payer: MEDICAID

## 2025-08-05 VITALS — SYSTOLIC BLOOD PRESSURE: 145 MMHG | DIASTOLIC BLOOD PRESSURE: 88 MMHG

## 2025-08-05 VITALS
HEIGHT: 73 IN | BODY MASS INDEX: 37.37 KG/M2 | SYSTOLIC BLOOD PRESSURE: 170 MMHG | OXYGEN SATURATION: 99 % | DIASTOLIC BLOOD PRESSURE: 84 MMHG | WEIGHT: 282 LBS | HEART RATE: 84 BPM

## 2025-08-05 DIAGNOSIS — Z12.9 ENCOUNTER FOR SCREENING FOR MALIGNANT NEOPLASM, SITE UNSPECIFIED: ICD-10-CM

## 2025-08-05 DIAGNOSIS — R29.818 OTHER SYMPTOMS AND SIGNS INVOLVING THE NERVOUS SYSTEM: ICD-10-CM

## 2025-08-05 DIAGNOSIS — R93.1 ABNORMAL FINDINGS ON DIAGNOSTIC IMAGING OF HEART AND CORONARY CIRCULATION: ICD-10-CM

## 2025-08-05 DIAGNOSIS — J84.10 PULMONARY FIBROSIS, UNSPECIFIED: ICD-10-CM

## 2025-08-05 DIAGNOSIS — I51.7 CARDIOMEGALY: ICD-10-CM

## 2025-08-05 DIAGNOSIS — E66.9 OBESITY, UNSPECIFIED: ICD-10-CM

## 2025-08-05 DIAGNOSIS — E04.9 NONTOXIC GOITER, UNSPECIFIED: ICD-10-CM

## 2025-08-05 DIAGNOSIS — Z00.00 ENCOUNTER FOR GENERAL ADULT MEDICAL EXAMINATION W/OUT ABNORMAL FINDINGS: ICD-10-CM

## 2025-08-05 DIAGNOSIS — R74.8 ABNORMAL LEVELS OF OTHER SERUM ENZYMES: ICD-10-CM

## 2025-08-05 DIAGNOSIS — I77.810 THORACIC AORTIC ECTASIA: ICD-10-CM

## 2025-08-05 DIAGNOSIS — Q21.12 PATENT FORAMEN OVALE: ICD-10-CM

## 2025-08-05 DIAGNOSIS — E78.5 HYPERLIPIDEMIA, UNSPECIFIED: ICD-10-CM

## 2025-08-05 DIAGNOSIS — R04.0 EPISTAXIS: ICD-10-CM

## 2025-08-05 DIAGNOSIS — I48.91 UNSPECIFIED ATRIAL FIBRILLATION: ICD-10-CM

## 2025-08-05 DIAGNOSIS — Z13.228 ENCOUNTER FOR SCREENING FOR OTHER METABOLIC DISORDERS: ICD-10-CM

## 2025-08-05 DIAGNOSIS — I10 ESSENTIAL (PRIMARY) HYPERTENSION: ICD-10-CM

## 2025-08-05 PROCEDURE — 99396 PREV VISIT EST AGE 40-64: CPT | Mod: 25

## 2025-08-05 PROCEDURE — 93000 ELECTROCARDIOGRAM COMPLETE: CPT

## 2025-08-06 DIAGNOSIS — R73.03 PREDIABETES.: ICD-10-CM

## 2025-08-06 LAB
25(OH)D3 SERPL-MCNC: 54.7 NG/ML
ALBUMIN SERPL ELPH-MCNC: 4.6 G/DL
ALBUMIN, RANDOM URINE: 4.5 MG/DL
ALP BLD-CCNC: 63 U/L
ALT SERPL-CCNC: 14 U/L
ANION GAP SERPL CALC-SCNC: 15 MMOL/L
APPEARANCE: CLEAR
AST SERPL-CCNC: 37 U/L
BACTERIA: NEGATIVE /HPF
BASOPHILS # BLD AUTO: 0.05 K/UL
BASOPHILS NFR BLD AUTO: 0.8 %
BILIRUB SERPL-MCNC: 0.6 MG/DL
BILIRUBIN URINE: NEGATIVE
BLOOD URINE: NEGATIVE
BUN SERPL-MCNC: 12 MG/DL
CALCIUM SERPL-MCNC: 10.4 MG/DL
CAST: 0 /LPF
CHLORIDE SERPL-SCNC: 103 MMOL/L
CHOLEST SERPL-MCNC: 235 MG/DL
CK SERPL-CCNC: 357 U/L
CO2 SERPL-SCNC: 23 MMOL/L
COLOR: YELLOW
CREAT SERPL-MCNC: 0.99 MG/DL
CREAT SPEC-SCNC: 62 MG/DL
EGFRCR SERPLBLD CKD-EPI 2021: 89 ML/MIN/1.73M2
EOSINOPHIL # BLD AUTO: 0.06 K/UL
EOSINOPHIL NFR BLD AUTO: 1 %
EPITHELIAL CELLS: 0 /HPF
ESTIMATED AVERAGE GLUCOSE: 123 MG/DL
FERRITIN SERPL-MCNC: 216 NG/ML
FOLATE SERPL-MCNC: 13 NG/ML
GLUCOSE QUALITATIVE U: NEGATIVE MG/DL
GLUCOSE SERPL-MCNC: 89 MG/DL
HBA1C MFR BLD HPLC: 5.9 %
HCT VFR BLD CALC: 47.5 %
HDLC SERPL-MCNC: 66 MG/DL
HGB BLD-MCNC: 16 G/DL
IMM GRANULOCYTES NFR BLD AUTO: 0.2 %
IRON SATN MFR SERPL: 23 %
IRON SERPL-MCNC: 86 UG/DL
KETONES URINE: NEGATIVE MG/DL
LDLC SERPL-MCNC: 149 MG/DL
LEUKOCYTE ESTERASE URINE: NEGATIVE
LYMPHOCYTES # BLD AUTO: 2.84 K/UL
LYMPHOCYTES NFR BLD AUTO: 47.9 %
MAN DIFF?: NORMAL
MCHC RBC-ENTMCNC: 27.9 PG
MCHC RBC-ENTMCNC: 33.7 G/DL
MCV RBC AUTO: 82.8 FL
MICROALBUMIN/CREAT 24H UR-RTO: 73 MG/G
MICROSCOPIC-UA: NORMAL
MONOCYTES # BLD AUTO: 0.57 K/UL
MONOCYTES NFR BLD AUTO: 9.6 %
NEUTROPHILS # BLD AUTO: 2.4 K/UL
NEUTROPHILS NFR BLD AUTO: 40.5 %
NITRITE URINE: NEGATIVE
NONHDLC SERPL-MCNC: 169 MG/DL
PH URINE: 6
PLATELET # BLD AUTO: 189 K/UL
POTASSIUM SERPL-SCNC: 4 MMOL/L
PROT SERPL-MCNC: 8 G/DL
PROTEIN URINE: NEGATIVE MG/DL
PSA SERPL-MCNC: 0.87 NG/ML
RBC # BLD: 5.74 M/UL
RBC # FLD: 14.6 %
RED BLOOD CELLS URINE: 0 /HPF
SODIUM SERPL-SCNC: 141 MMOL/L
SPECIFIC GRAVITY URINE: 1.01
T4 FREE SERPL-MCNC: 1.3 NG/DL
TIBC SERPL-MCNC: 369 UG/DL
TRIGL SERPL-MCNC: 114 MG/DL
TSH SERPL-ACNC: 0.85 UIU/ML
UIBC SERPL-MCNC: 283 UG/DL
URATE SERPL-MCNC: 4.8 MG/DL
UROBILINOGEN URINE: 1 MG/DL
VIT B12 SERPL-MCNC: 1014 PG/ML
WBC # FLD AUTO: 5.93 K/UL
WHITE BLOOD CELLS URINE: 0 /HPF

## 2025-08-11 ENCOUNTER — APPOINTMENT (OUTPATIENT)
Dept: CARDIOLOGY | Facility: CLINIC | Age: 55
End: 2025-08-11
Payer: MEDICAID

## 2025-08-11 ENCOUNTER — APPOINTMENT (OUTPATIENT)
Dept: ULTRASOUND IMAGING | Facility: CLINIC | Age: 55
End: 2025-08-11
Payer: MEDICAID

## 2025-08-11 PROCEDURE — 93306 TTE W/DOPPLER COMPLETE: CPT

## 2025-08-11 PROCEDURE — 76536 US EXAM OF HEAD AND NECK: CPT

## 2025-08-18 DIAGNOSIS — E04.1 NONTOXIC SINGLE THYROID NODULE: ICD-10-CM

## 2025-09-10 ENCOUNTER — APPOINTMENT (OUTPATIENT)
Dept: ELECTROPHYSIOLOGY | Facility: CLINIC | Age: 55
End: 2025-09-10